# Patient Record
Sex: MALE | Race: WHITE | NOT HISPANIC OR LATINO | Employment: FULL TIME | URBAN - METROPOLITAN AREA
[De-identification: names, ages, dates, MRNs, and addresses within clinical notes are randomized per-mention and may not be internally consistent; named-entity substitution may affect disease eponyms.]

---

## 2018-06-15 ENCOUNTER — OFFICE VISIT (OUTPATIENT)
Dept: FAMILY MEDICINE CLINIC | Facility: CLINIC | Age: 38
End: 2018-06-15

## 2018-06-15 VITALS
WEIGHT: 216 LBS | DIASTOLIC BLOOD PRESSURE: 90 MMHG | BODY MASS INDEX: 30.24 KG/M2 | TEMPERATURE: 98.2 F | OXYGEN SATURATION: 99 % | SYSTOLIC BLOOD PRESSURE: 128 MMHG | HEART RATE: 75 BPM | HEIGHT: 71 IN

## 2018-06-15 DIAGNOSIS — E78.49 OTHER HYPERLIPIDEMIA: ICD-10-CM

## 2018-06-15 DIAGNOSIS — Z00.00 ENCOUNTER FOR PREVENTIVE HEALTH EXAMINATION: Primary | ICD-10-CM

## 2018-06-15 DIAGNOSIS — Z11.3 SCREENING EXAMINATION FOR STD (SEXUALLY TRANSMITTED DISEASE): ICD-10-CM

## 2018-06-15 PROCEDURE — 99395 PREV VISIT EST AGE 18-39: CPT | Performed by: INTERNAL MEDICINE

## 2018-06-15 NOTE — PROGRESS NOTES
Subjective chief complaint is a STD check but also to establish care  Juancho Urrutia is a 37 y.o. male.     History of Present Illness   Juancho is here today to establish care.  He was a previous patient of Dr. Beasley.  It is been since approximately 2015 since the patient was seen there.  He really had no significant medical problems.  Review of his laboratory work does show an elevated LDL cholesterol and triglycerides.  He also has a history of allergies.  Today he would like to be checked for STDs.  He has had unprotected sex with 2 female partners.  His not had any symptoms in terms of fever, chills, penile discharge, or rashes.  Social history he is a lifelong nonsmoker.  He rarely drinks alcohol.  Family history is remarkable for hypertension and hyperlipidemia in his parents.  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  The following portions of the patient's history were reviewed and updated as appropriate: allergies, current medications, past family history, past medical history, past social history, past surgical history and problem list.    Review of Systems   Constitutional:        He has recently changed his diet and lost approximately 20-30 pounds.   All other systems reviewed and are negative.      Objective   Physical Exam   Constitutional: He is oriented to person, place, and time. He appears well-developed and well-nourished.   HENT:   Head: Normocephalic and atraumatic.   Eyes: Conjunctivae are normal. No scleral icterus.   Neck: Carotid bruit is not present. No thyromegaly present.   Cardiovascular: Normal rate, regular rhythm, normal heart sounds and intact distal pulses.    Pulmonary/Chest: Effort normal and breath sounds normal.   Abdominal: Soft. Bowel sounds are normal. He exhibits no distension and no mass. There is no tenderness. There is no guarding.   Musculoskeletal: He exhibits no edema.   Neurological: He is alert and oriented to person, place, and time. No cranial nerve deficit.   Skin:  Skin is dry.   Psychiatric: He has a normal mood and affect.   Nursing note and vitals reviewed.        Assessment/Plan   Juancho was seen today for labs only and establish care.    Diagnoses and all orders for this visit:    Encounter for preventive health examination    Screening examination for STD (sexually transmitted disease)  -     HSV 1 & 2 - Specific Antibody, IgG; Future  -     RPR; Future  -     Hepatitis C Antibody; Future  -     Chlamydia trachomatis, Neisseria gonorrhoeae, PCR - Swab, Urine, Clean Catch; Future  -     HIV-1/O/2 Ag/Ab w Reflex; Future    Other hyperlipidemia  -     Lipid Panel; Future  -     Comprehensive Metabolic Panel; Future

## 2018-06-18 ENCOUNTER — RESULTS ENCOUNTER (OUTPATIENT)
Dept: FAMILY MEDICINE CLINIC | Facility: CLINIC | Age: 38
End: 2018-06-18

## 2018-06-18 DIAGNOSIS — Z11.3 SCREENING EXAMINATION FOR STD (SEXUALLY TRANSMITTED DISEASE): ICD-10-CM

## 2018-06-18 DIAGNOSIS — E78.49 OTHER HYPERLIPIDEMIA: ICD-10-CM

## 2018-06-19 LAB
ALBUMIN SERPL-MCNC: 4.4 G/DL (ref 3.5–5.2)
ALBUMIN/GLOB SERPL: 1.5 G/DL
ALP SERPL-CCNC: 74 U/L (ref 39–117)
ALT SERPL-CCNC: 32 U/L (ref 1–41)
AST SERPL-CCNC: 23 U/L (ref 1–40)
BILIRUB SERPL-MCNC: 0.8 MG/DL (ref 0.1–1.2)
BUN SERPL-MCNC: 14 MG/DL (ref 6–20)
BUN/CREAT SERPL: 12.1 (ref 7–25)
CALCIUM SERPL-MCNC: 9.4 MG/DL (ref 8.6–10.5)
CHLORIDE SERPL-SCNC: 99 MMOL/L (ref 98–107)
CHOLEST SERPL-MCNC: 218 MG/DL (ref 0–200)
CO2 SERPL-SCNC: 23.9 MMOL/L (ref 22–29)
CREAT SERPL-MCNC: 1.16 MG/DL (ref 0.76–1.27)
GFR SERPLBLD CREATININE-BSD FMLA CKD-EPI: 72 ML/MIN/1.73
GFR SERPLBLD CREATININE-BSD FMLA CKD-EPI: 87 ML/MIN/1.73
GLOBULIN SER CALC-MCNC: 2.9 GM/DL
GLUCOSE SERPL-MCNC: 100 MG/DL (ref 65–99)
HCV AB S/CO SERPL IA: <0.1 S/CO RATIO (ref 0–0.9)
HDLC SERPL-MCNC: 34 MG/DL (ref 40–60)
HIV 1+2 AB+HIV1 P24 AG SERPL QL IA: NON REACTIVE
HSV1 IGG SER IA-ACNC: <0.91 INDEX (ref 0–0.9)
HSV2 IGG SER IA-ACNC: <0.91 INDEX (ref 0–0.9)
LDLC SERPL CALC-MCNC: 139 MG/DL (ref 0–100)
POTASSIUM SERPL-SCNC: 4.2 MMOL/L (ref 3.5–5.2)
PROT SERPL-MCNC: 7.3 G/DL (ref 6–8.5)
RPR SER QL: NORMAL
SODIUM SERPL-SCNC: 139 MMOL/L (ref 136–145)
TRIGL SERPL-MCNC: 227 MG/DL (ref 0–150)
VLDLC SERPL CALC-MCNC: 45.4 MG/DL (ref 5–40)

## 2020-07-23 ENCOUNTER — OFFICE VISIT (OUTPATIENT)
Dept: FAMILY MEDICINE CLINIC | Facility: CLINIC | Age: 40
End: 2020-07-23

## 2020-07-23 VITALS
TEMPERATURE: 97.3 F | WEIGHT: 233.8 LBS | BODY MASS INDEX: 33.47 KG/M2 | RESPIRATION RATE: 18 BRPM | SYSTOLIC BLOOD PRESSURE: 120 MMHG | DIASTOLIC BLOOD PRESSURE: 80 MMHG | HEART RATE: 65 BPM | HEIGHT: 70 IN | OXYGEN SATURATION: 99 %

## 2020-07-23 DIAGNOSIS — R73.9 HYPERGLYCEMIA: ICD-10-CM

## 2020-07-23 DIAGNOSIS — N48.9 LESION OF PENIS: ICD-10-CM

## 2020-07-23 DIAGNOSIS — E78.49 OTHER HYPERLIPIDEMIA: Primary | ICD-10-CM

## 2020-07-23 PROCEDURE — 99213 OFFICE O/P EST LOW 20 MIN: CPT | Performed by: INTERNAL MEDICINE

## 2020-07-23 NOTE — PROGRESS NOTES
Subjective Chief complaint is follow-up on cholesterol but also spots on his penis  Juancho Urrutia is a 40 y.o. male.     History of Present Illness Juancho is here today for follow-up on his cholesterol.  He has gained some weight since his last visit.  He is working on his diet again.  Also at his last visit he had some mild hyperglycemia.  He is complaining of a spot on his penis.  This was noticed by his present partner.  It was noticed a few days ago and has since nearly resolved.  He has had no fever or chills.  He has had no blood in the urine.  He has had no penile discharge and no testicular pain.    The following portions of the patient's history were reviewed and updated as appropriate: allergies, current medications, past family history, past medical history, past social history, past surgical history and problem list.    Review of Systems   Constitutional: Negative for chills and fever.   Genitourinary: Positive for genital sores. Negative for discharge, flank pain, penile pain and testicular pain.       Objective   Physical Exam   Constitutional: He appears well-developed and well-nourished.   Cardiovascular: Normal rate, regular rhythm and normal heart sounds.   Pulmonary/Chest: Effort normal and breath sounds normal.   Abdominal: Soft. Bowel sounds are normal.   Genitourinary:   Genitourinary Comments: I currently do not see any significant lesions on his penis.  The place in question certainly has no significant erythema or blistering.   Musculoskeletal: He exhibits no edema.   Nursing note and vitals reviewed.        Assessment/Plan   Juancho was seen today for hyperlipidemia.    Diagnoses and all orders for this visit:    Other hyperlipidemia  -     Lipid Panel    Lesion of penis  -     Chlamydia trachomatis, Neisseria gonorrhoeae, PCR - , Urine, Clean Catch  -     RPR  -     HIV-1/O/2 Ag/Ab w Reflex  -     HSV 1 & 2 - Specific Antibody, IgG    Hyperglycemia  -     Comprehensive Metabolic Panel  -      Hemoglobin A1c    Juancho is here today for follow-up on his cholesterol.  We are also going to check on his sugar.  I do not see a penile lesion.  We will check for other STDs.  I suspect what ever he saw was some sort of friction injury.

## 2020-07-25 LAB
ALBUMIN SERPL-MCNC: 4.8 G/DL (ref 3.5–5.2)
ALBUMIN/GLOB SERPL: 1.8 G/DL
ALP SERPL-CCNC: 67 U/L (ref 39–117)
ALT SERPL-CCNC: 79 U/L (ref 1–41)
AST SERPL-CCNC: 35 U/L (ref 1–40)
BILIRUB SERPL-MCNC: 0.5 MG/DL (ref 0–1.2)
BUN SERPL-MCNC: 16 MG/DL (ref 6–20)
BUN/CREAT SERPL: 13.9 (ref 7–25)
C TRACH RRNA SPEC QL NAA+PROBE: NEGATIVE
CALCIUM SERPL-MCNC: 9.7 MG/DL (ref 8.6–10.5)
CHLORIDE SERPL-SCNC: 102 MMOL/L (ref 98–107)
CHOLEST SERPL-MCNC: 265 MG/DL (ref 0–200)
CO2 SERPL-SCNC: 26.5 MMOL/L (ref 22–29)
CREAT SERPL-MCNC: 1.15 MG/DL (ref 0.76–1.27)
GLOBULIN SER CALC-MCNC: 2.6 GM/DL
GLUCOSE SERPL-MCNC: 120 MG/DL (ref 65–99)
HBA1C MFR BLD: 5.9 % (ref 4.8–5.6)
HDLC SERPL-MCNC: 33 MG/DL (ref 40–60)
HIV 1+2 AB+HIV1 P24 AG SERPL QL IA: NON REACTIVE
HSV1 IGG SER IA-ACNC: <0.91 INDEX (ref 0–0.9)
HSV2 IGG SER IA-ACNC: <0.91 INDEX (ref 0–0.9)
LDLC SERPL CALC-MCNC: 178 MG/DL (ref 0–100)
N GONORRHOEA RRNA SPEC QL NAA+PROBE: NEGATIVE
POTASSIUM SERPL-SCNC: 4.6 MMOL/L (ref 3.5–5.2)
PROT SERPL-MCNC: 7.4 G/DL (ref 6–8.5)
RPR SER QL: NORMAL
SODIUM SERPL-SCNC: 138 MMOL/L (ref 136–145)
TRIGL SERPL-MCNC: 270 MG/DL (ref 0–150)
VLDLC SERPL CALC-MCNC: 54 MG/DL

## 2020-10-02 ENCOUNTER — TELEPHONE (OUTPATIENT)
Dept: FAMILY MEDICINE CLINIC | Facility: CLINIC | Age: 40
End: 2020-10-02

## 2020-10-02 NOTE — TELEPHONE ENCOUNTER
Caller: Juancho Urrutia    Relationship to patient: Self    Best call back number: [phone]    Patient is needing: Patient calling to schedule labs.

## 2021-02-19 ENCOUNTER — OFFICE VISIT (OUTPATIENT)
Dept: FAMILY MEDICINE CLINIC | Facility: CLINIC | Age: 41
End: 2021-02-19

## 2021-02-19 VITALS
HEIGHT: 70 IN | SYSTOLIC BLOOD PRESSURE: 130 MMHG | BODY MASS INDEX: 28.2 KG/M2 | OXYGEN SATURATION: 98 % | HEART RATE: 74 BPM | WEIGHT: 197 LBS | DIASTOLIC BLOOD PRESSURE: 86 MMHG | TEMPERATURE: 97.7 F

## 2021-02-19 DIAGNOSIS — E78.1 PRIMARY HYPERTRIGLYCERIDEMIA: Primary | ICD-10-CM

## 2021-02-19 DIAGNOSIS — R73.02 IMPAIRED GLUCOSE TOLERANCE: ICD-10-CM

## 2021-02-19 LAB
ALBUMIN SERPL-MCNC: 4.9 G/DL (ref 3.5–5.2)
ALBUMIN/GLOB SERPL: 1.8 G/DL
ALP SERPL-CCNC: 62 U/L (ref 39–117)
ALT SERPL-CCNC: 27 U/L (ref 1–41)
APPEARANCE UR: CLEAR
AST SERPL-CCNC: 19 U/L (ref 1–40)
BACTERIA #/AREA URNS HPF: NORMAL /HPF
BILIRUB SERPL-MCNC: 0.5 MG/DL (ref 0–1.2)
BILIRUB UR QL STRIP: NEGATIVE
BUN SERPL-MCNC: 16 MG/DL (ref 6–20)
BUN/CREAT SERPL: 15.7 (ref 7–25)
CALCIUM SERPL-MCNC: 9.8 MG/DL (ref 8.6–10.5)
CASTS URNS MICRO: NORMAL
CHLORIDE SERPL-SCNC: 102 MMOL/L (ref 98–107)
CHOLEST SERPL-MCNC: 293 MG/DL (ref 0–200)
CO2 SERPL-SCNC: 25.9 MMOL/L (ref 22–29)
COLOR UR: YELLOW
CREAT SERPL-MCNC: 1.02 MG/DL (ref 0.76–1.27)
EPI CELLS #/AREA URNS HPF: NORMAL /HPF
GLOBULIN SER CALC-MCNC: 2.7 GM/DL
GLUCOSE SERPL-MCNC: 89 MG/DL (ref 65–99)
GLUCOSE UR QL: NEGATIVE
HBA1C MFR BLD: 5.6 % (ref 4.8–5.6)
HDLC SERPL-MCNC: 46 MG/DL (ref 40–60)
HGB UR QL STRIP: NEGATIVE
KETONES UR QL STRIP: NEGATIVE
LDLC SERPL CALC-MCNC: 231 MG/DL (ref 0–100)
LEUKOCYTE ESTERASE UR QL STRIP: NEGATIVE
NITRITE UR QL STRIP: NEGATIVE
PH UR STRIP: 5.5 [PH] (ref 5–8)
POTASSIUM SERPL-SCNC: 4.6 MMOL/L (ref 3.5–5.2)
PROT SERPL-MCNC: 7.6 G/DL (ref 6–8.5)
PROT UR QL STRIP: NEGATIVE
RBC #/AREA URNS HPF: NORMAL /HPF
SODIUM SERPL-SCNC: 137 MMOL/L (ref 136–145)
SP GR UR: 1.02 (ref 1–1.03)
TRIGL SERPL-MCNC: 95 MG/DL (ref 0–150)
UROBILINOGEN UR STRIP-MCNC: NORMAL MG/DL
VLDLC SERPL CALC-MCNC: 16 MG/DL (ref 5–40)
WBC #/AREA URNS HPF: NORMAL /HPF

## 2021-02-19 PROCEDURE — 99213 OFFICE O/P EST LOW 20 MIN: CPT | Performed by: INTERNAL MEDICINE

## 2021-02-19 NOTE — PROGRESS NOTES
Answers for HPI/ROS submitted by the patient on 2/16/2021   What is the primary reason for your visit?: Other  Please describe your symptoms.: Blood work last August returned Pre-Diabetic and Fatty Liver, wanted new blood work to see if there was an improvement after weight loss and diet change.  Have you had these symptoms before?: No  How long have you been having these symptoms?: Greater than 2 weeks  Subjective Chief complaint is checkup on blood sugar  Juancho Urrutia is a 40 y.o. male.     History of Present Illness Juancho is here today for recheck on his blood sugar.  At his previous visit he had an elevated blood sugar with a elevated hemoglobin A1c.  His cholesterol numbers were also elevated and he had some elevations in his liver numbers.  Since that time he has worked on cutting out sweets and carbohydrates.  He has lost approximately 30 to 40 pounds.  He is hoping that his numbers look better.  He feels better now that he is exercising more and eating more healthy foods.    The following portions of the patient's history were reviewed and updated as appropriate: allergies, current medications, past family history, past medical history, past social history, past surgical history and problem list.    Review of Systems   Respiratory: Negative for chest tightness and shortness of breath.    Neurological: Negative for dizziness, light-headedness and headache.       Objective   Physical Exam  Vitals signs and nursing note reviewed.   Constitutional:       Appearance: Normal appearance.   Neck:      Vascular: No carotid bruit.   Cardiovascular:      Rate and Rhythm: Normal rate and regular rhythm.   Pulmonary:      Effort: Pulmonary effort is normal.      Breath sounds: No wheezing or rales.   Neurological:      Mental Status: He is alert.           Assessment/Plan   Diagnoses and all orders for this visit:    1. Primary hypertriglyceridemia (Primary)  -     Lipid Panel    2. Impaired glucose tolerance  -      Comprehensive Metabolic Panel  -     Hemoglobin A1c  -     Urinalysis With Microscopic - Urine, Clean Catch      Juancho is following up today on his impaired glucose tolerance.  This was likely a weight related issue and he seems to have lost a good amount of weight.  We will also recheck his cholesterol and liver test.

## 2021-04-16 ENCOUNTER — BULK ORDERING (OUTPATIENT)
Dept: CASE MANAGEMENT | Facility: OTHER | Age: 41
End: 2021-04-16

## 2021-04-16 DIAGNOSIS — Z23 IMMUNIZATION DUE: ICD-10-CM

## 2023-04-13 LAB
ALANINE AMINOTRANSFERASE (SGPT) (U/L) IN SER/PLAS: 34 U/L (ref 10–52)
ALBUMIN (G/DL) IN SER/PLAS: 4.3 G/DL (ref 3.4–5)
ALBUMIN (MG/L) IN URINE: <7 MG/L
ALBUMIN/CREATININE (UG/MG) IN URINE: NORMAL UG/MG CRT (ref 0–30)
ALKALINE PHOSPHATASE (U/L) IN SER/PLAS: 50 U/L (ref 33–120)
ANION GAP IN SER/PLAS: 12 MMOL/L (ref 10–20)
ASPARTATE AMINOTRANSFERASE (SGOT) (U/L) IN SER/PLAS: 35 U/L (ref 9–39)
BILIRUBIN TOTAL (MG/DL) IN SER/PLAS: 0.9 MG/DL (ref 0–1.2)
C PEPTIDE (NG/ML) IN SER/PLAS: 0.7 NG/ML (ref 0.7–3.9)
CALCIUM (MG/DL) IN SER/PLAS: 9.8 MG/DL (ref 8.6–10.6)
CARBON DIOXIDE, TOTAL (MMOL/L) IN SER/PLAS: 29 MMOL/L (ref 21–32)
CHLORIDE (MMOL/L) IN SER/PLAS: 101 MMOL/L (ref 98–107)
CHOLESTEROL (MG/DL) IN SER/PLAS: 149 MG/DL (ref 0–199)
CHOLESTEROL IN HDL (MG/DL) IN SER/PLAS: 56.3 MG/DL
CHOLESTEROL/HDL RATIO: 2.6
CREATININE (MG/DL) IN SER/PLAS: 0.94 MG/DL (ref 0.5–1.3)
CREATININE (MG/DL) IN URINE: 95.6 MG/DL (ref 20–370)
ESTIMATED AVERAGE GLUCOSE FOR HBA1C: 197 MG/DL
GFR MALE: >90 ML/MIN/1.73M2
GLUCOSE (MG/DL) IN SER/PLAS: 173 MG/DL (ref 74–99)
HEMOGLOBIN A1C/HEMOGLOBIN TOTAL IN BLOOD: 8.5 %
LDL: 78 MG/DL (ref 0–99)
POTASSIUM (MMOL/L) IN SER/PLAS: 3.9 MMOL/L (ref 3.5–5.3)
PROTEIN TOTAL: 7.2 G/DL (ref 6.4–8.2)
SODIUM (MMOL/L) IN SER/PLAS: 138 MMOL/L (ref 136–145)
THYROTROPIN (MIU/L) IN SER/PLAS BY DETECTION LIMIT <= 0.05 MIU/L: 1.85 MIU/L (ref 0.44–3.98)
TRIGLYCERIDE (MG/DL) IN SER/PLAS: 76 MG/DL (ref 0–149)
UREA NITROGEN (MG/DL) IN SER/PLAS: 20 MG/DL (ref 6–23)
VLDL: 15 MG/DL (ref 0–40)

## 2023-06-16 ENCOUNTER — TELEPHONE (OUTPATIENT)
Dept: PRIMARY CARE | Facility: CLINIC | Age: 43
End: 2023-06-16

## 2023-06-21 ENCOUNTER — OFFICE VISIT (OUTPATIENT)
Dept: PRIMARY CARE | Facility: CLINIC | Age: 43
End: 2023-06-21
Payer: COMMERCIAL

## 2023-06-21 VITALS
SYSTOLIC BLOOD PRESSURE: 104 MMHG | WEIGHT: 210 LBS | HEART RATE: 70 BPM | BODY MASS INDEX: 27.83 KG/M2 | DIASTOLIC BLOOD PRESSURE: 60 MMHG | HEIGHT: 73 IN

## 2023-06-21 DIAGNOSIS — M79.2 NEUROPATHIC PAIN: Primary | ICD-10-CM

## 2023-06-21 DIAGNOSIS — K70.30 ALCOHOLIC CIRRHOSIS OF LIVER WITHOUT ASCITES (MULTI): ICD-10-CM

## 2023-06-21 PROCEDURE — 99214 OFFICE O/P EST MOD 30 MIN: CPT | Performed by: STUDENT IN AN ORGANIZED HEALTH CARE EDUCATION/TRAINING PROGRAM

## 2023-06-21 RX ORDER — GABAPENTIN 300 MG/1
300 CAPSULE ORAL 3 TIMES DAILY
Qty: 270 CAPSULE | Refills: 1 | Status: SHIPPED | OUTPATIENT
Start: 2023-06-21 | End: 2024-04-01 | Stop reason: SDUPTHER

## 2023-06-21 RX ORDER — METFORMIN HYDROCHLORIDE 1000 MG/1
TABLET ORAL 2 TIMES DAILY
COMMUNITY

## 2023-06-21 RX ORDER — INSULIN GLARGINE-YFGN 100 [IU]/ML
INJECTION, SOLUTION SUBCUTANEOUS
COMMUNITY
Start: 2023-04-30

## 2023-06-21 RX ORDER — PANTOPRAZOLE SODIUM 40 MG/1
40 TABLET, DELAYED RELEASE ORAL DAILY
COMMUNITY
End: 2023-11-11

## 2023-06-21 RX ORDER — PEN NEEDLE, DIABETIC 31 GX5/16"
NEEDLE, DISPOSABLE MISCELLANEOUS
COMMUNITY
Start: 2022-09-24

## 2023-06-21 RX ORDER — INSULIN LISPRO 100 [IU]/ML
INJECTION, SOLUTION INTRAVENOUS; SUBCUTANEOUS
COMMUNITY

## 2023-06-21 NOTE — PROGRESS NOTES
Joseph Hawkins is a 42 y.o. male seen in Clinic at [facility] by Dr. Larry Gorman on 06/21/23 for routine care, as well as for management of the following chronic medical conditions: T2DM, GERD, prior alcohol dependence, chronic pancreatitis, cirrhosis (per imaging) with patient reported history of esophageal varices. Patient presents today for follow up of neuropathic pain. Notes he has been on Tizanadine for several years for this, discussed not good long term option. He has been out of medication for a week or so, agreeable to trialing other medication. History of alcohol abuse and poorly controlled T2DM, will start on Gabapentin 300mg at bedtime and uptitrate to TID over time with plan for follow up to assess response. Follows with endo at [facility], last labs from April 2023 with modestly improved A1C of 8.5 from 9.2 previously. Only on insulin (long acting and mealtime) and metformin at this time. Had been referred to hepatology in the past given alcohol history and findings on imaging concerning for cirrhosis. Today, upon questioning, also noted prior history of what appears to be consistent with variceal bleeding. Discussed importance of establishing care, would likely be candidate for B-blocker for recurrent prevention of bleeding. Continued alcohol cessation recommended. Close follow up in 2-3 months.     ACUTE CONCERN:   #Neuropathic Pain  - likely multifactorial in setting of alcohol abuse, poorly controlled T2DM  - stopped Tizanidine given ran out of medication   - agreeable to alternate agent/trial  - Gabapentin 300mg TID initial goal (start at bedtime, then titrate)   - follow up in 2-3 months with additional titration as needed at that time, as well as labs    CHRONIC MEDICAL CONDITIONS:   #GERD  - Pantoprazole 40mg daily   - prior alcohol abuse, sober for 3 years    #T2DM   - Lantus: 20-30 units QHS  - Humalog: on average 10 units pre-meal  - Metformin: 500mg QAM, 500mg QPM  - Has seen endocrinology   - Last  "A1C 8.5 in 04/2023  - Dr. Siegel in [place]   - Patient not currently on ACE/ARB or statin; discuss at follow up visit     #Chronic Pancreatitis   - from prior alcohol abuse/dependence   - recent hospitalization with confirmation of this on imaging   - GI referral at last visit in 11/2022, never followed up     #Prior Alcohol Abuse  - sober x3 years     #Cirrhosis   - History of alcohol abuse  - GI/hepatology referral in November, never followed up   - Evidence of recannulization paraumbilical vein consistent with portal HTN on imaging from 11/2022  - Patient notes he did require endoscopy for esophageal varices in the past   - No evidence of decompensation on exam today   [  ] hepatology referral: again stressed importance of establishing with hepatology given findings on imaging and history; would likely benefit from B-Blocker such as carvedilol given history   - Continued alcohol cessation     Past Medical History: as above   No past medical history on file.  Subspecialty Medical Care: Endo; again recommended hepatology referral today     Past Surgical History: denies   No past surgical history on file.    Medications:  Current Outpatient Medications:     BD Ultra-Fine Short Pen Needle 31 gauge x 5/16\" needle, USE ONCE DAILY AS NEEDED, Disp: , Rfl:     Semglee,insulin glarg-yfgn,Pen 100 unit/mL (3 mL) insulin pen, , Disp: , Rfl:     gabapentin (Neurontin) 300 mg capsule, Take 1 capsule (300 mg) by mouth 3 times a day., Disp: 270 capsule, Rfl: 1    HumaLOG KwikPen Insulin 100 unit/mL injection, PLEASE SEE ATTACHED FOR DETAILED DIRECTIONS, Disp: , Rfl:     metFORMIN (Glucophage) 1,000 mg tablet, Take by mouth twice a day., Disp: , Rfl:     pantoprazole (ProtoNix) 40 mg EC tablet, Take 1 tablet (40 mg) by mouth once daily., Disp: , Rfl:   Pharmacy: [facility] ([place]     Allergies: NKDA  No Known Allergies    Immunizations: Tdap 2022, PCV eligible given T2DM status, discuss at follow up visit     Family History: no " "chronic medical conditions that he is aware of   No family history on file.    Social History:   Home/Living Situation: lives at home with wife; feels safe at home; lives in [place]   Education:   Employment/Work/Vocational: not currently working; previously worked in banking   Activities: hobbies include jeff, paint miniatures, fantasy reading   Drug Use: 12mg nicotine but cutting; started smoking middle school; Delta 8 for anxiety (patient states legal in [place]), states it works better for anxiety than other medications (SSRIs, antipsychotics); prior alcohol abuse, 3 years sober   Diet: no dietary concerns   Sexuality/Contraception/Menstrual History:    Suicide/Depression/Anxiety: defers recurrent psych referral (prior adverse reactions to many SSRIs, antipsychotics)   Sleep: sleep concerns    Transition Processes:  Financial/Health Insurance: has insurance   Transportation: wife helps with transportation   Voting: registered to vote  Legal/Guardian: Wife is emergency contact, Teresa Hawkins [phone]  Contact Information: [phone]    Visit Vitals  /60   Pulse 70   Ht 1.854 m (6' 1\")   Wt 95.3 kg (210 lb)   BMI 27.71 kg/m²   BSA 2.22 m²      PHYSICAL EXAM:   General:  male in NAD, mildly disheveled appearance   HEENT: NCAT, MMM  CV: RRR  PULM: CTAB  ABD: soft, NT, ND, no rebound/guarding, no ascites noted   : no suprapubic or CVA tenderness  EXT: WWP, no edema  NEURO: A&Ox4, no gross motor or sensory deficits     Assessment/Plan    Joseph Hawkins is a 42 y.o. male seen in Clinic at [facility] by Dr. Larry Gorman on 06/21/23 for routine care, as well as for management of the following chronic medical conditions:  T2DM, GERD, prior alcohol dependence, chronic pancreatitis, cirrhosis (per imaging) with patient reported history of esophageal varices. Patient presents today for follow up of neuropathic pain. Notes he has been on Tizanadine for several years for this, discussed not good long term " option. He has been out of medication for a week or so, agreeable to trialing other medication. History of alcohol abuse and poorly controlled T2DM, will start on Gabapentin 300mg at bedtime and uptitrate to TID over time with plan for follow up to assess response. Follows with endo at [facility], last labs from April 2023 with modestly improved A1C of 8.5 from 9.2 previously. Only on insulin (long acting and mealtime) and metformin at this time. Had been referred to hepatology in the past given alcohol history and findings on imaging concerning for cirrhosis. Today, upon questioning, also noted prior history of what appears to be consistent with variceal bleeding. Discussed importance of establishing care, would likely be candidate for B-blocker for recurrent prevention of bleeding. Continued alcohol cessation recommended. Close follow up in 2-3 months.     ACUTE CONCERN:   #Neuropathic Pain  - likely multifactorial in setting of alcohol abuse, poorly controlled T2DM  - stopped Tizanidine given ran out of medication   - agreeable to alternate agent/trial  - Gabapentin 300mg TID initial goal (start at bedtime, then titrate)   - follow up in 2-3 months with additional titration as needed at that time, as well as labs    CHRONIC MEDICAL CONDITIONS:   #GERD  - Pantoprazole 40mg daily   - prior alcohol abuse, sober for 3 years    #T2DM   - Lantus: 20-30 units QHS  - Humalog: on average 10 units pre-meal  - Metformin: 500mg QAM, 500mg QPM  - Has seen endocrinology   - Last A1C 8.5 in 04/2023  - Dr. Siegel in [place]   - Patient not currently on ACE/ARB or statin; discuss at follow up visit     #Chronic Pancreatitis   - from prior alcohol abuse/dependence   - recent hospitalization with confirmation of this on imaging   - GI referral at last visit in 11/2022, never followed up     #Prior Alcohol Abuse  - sober x3 years     #Cirrhosis   - History of alcohol abuse  - GI/hepatology referral in November, never followed up   -  Evidence of recannulization paraumbilical vein consistent with portal HTN on imaging from 11/2022  - Patient notes he did require endoscopy for esophageal varices in the past   - No evidence of decompensation on exam today   [  ] hepatology referral: again stressed importance of establishing with hepatology given findings on imaging and history; would likely benefit from B-Blocker such as carvedilol given history   - Continued alcohol cessation     #Health Maintenance    Cancer Screening  - Colorectal Cancer Screening: likely requires recurrent EGD (unclear last)   - Lung Cancer Screening: tobacco use, discuss at 50   - Prostate Cancer Screening: due at 55    Laboratory Screening  - Lipid Screen: T2DM, discuss moderate intensity statin at next visit   - ASCVD Score: T2DM; last labs in 12/2022  - A1C, glucose screen: 8.5% in 04/2023 as above   - STI, HIV, Hep B screen: negative 12/2022  - Hep C screen:     Imaging Screening  - AAA screening: due at 65  - Osteoporosis/DEXA screening: screening at some point given alcohol history     Immunizations:   - Influenza: annual recommended   - COVID: updated boosters recommended  - Tdap: 2022  - Prevnar, Pneumovax: PCV-20 eligible given T2DM   - Shingrix: due at 50    Other Screening  - Health Literacy Assessment: poor  - Depression screen: known history  - Home safety/partner violence screen: negative  - Hearing/Vision screens: optho recommended   - Alcohol/tobacco/drug use screen: tobacco use, prior alcohol (sober ~3 years)   - Healthcare POA/Advanced Directives: wife    Referrals: Hepatology, Gabapentin trial   Return to clinic in 2-3 months for follow-up, sooner if acute issues arise.    Patient Discussion:    Please call back the office with any questions at [phone]. In the case of an emergency, please call 911 or go to the nearest Emergency Department.      Larry Gorman MD  Internal Medicine-Pediatrics  [facility] [address], [address]  P: [phone],  F: [phone]

## 2023-09-14 ENCOUNTER — OFFICE VISIT (OUTPATIENT)
Dept: PRIMARY CARE | Facility: CLINIC | Age: 43
End: 2023-09-14
Payer: COMMERCIAL

## 2023-09-14 VITALS
WEIGHT: 215 LBS | HEART RATE: 80 BPM | SYSTOLIC BLOOD PRESSURE: 105 MMHG | BODY MASS INDEX: 28.49 KG/M2 | OXYGEN SATURATION: 95 % | DIASTOLIC BLOOD PRESSURE: 68 MMHG | HEIGHT: 73 IN

## 2023-09-14 DIAGNOSIS — M79.2 NEUROPATHIC PAIN: ICD-10-CM

## 2023-09-14 DIAGNOSIS — K70.30 ALCOHOLIC CIRRHOSIS OF LIVER WITHOUT ASCITES (MULTI): ICD-10-CM

## 2023-09-14 DIAGNOSIS — L02.92 FURUNCLE: Primary | ICD-10-CM

## 2023-09-14 DIAGNOSIS — M62.838 MUSCLE SPASM: ICD-10-CM

## 2023-09-14 PROCEDURE — 99213 OFFICE O/P EST LOW 20 MIN: CPT | Performed by: STUDENT IN AN ORGANIZED HEALTH CARE EDUCATION/TRAINING PROGRAM

## 2023-09-14 RX ORDER — INSULIN GLARGINE 100 [IU]/ML
6 INJECTION, SOLUTION SUBCUTANEOUS
COMMUNITY
Start: 2021-11-08

## 2023-09-14 RX ORDER — CHLORHEXIDINE GLUCONATE ORAL RINSE 1.2 MG/ML
SOLUTION DENTAL
COMMUNITY
Start: 2023-08-30

## 2023-09-14 RX ORDER — TIZANIDINE 2 MG/1
2 TABLET ORAL EVERY 6 HOURS PRN
Qty: 30 TABLET | Refills: 0 | Status: SHIPPED | OUTPATIENT
Start: 2023-09-14 | End: 2023-09-24

## 2023-09-14 NOTE — PROGRESS NOTES
Joseph Hawkins is a 43 y.o. male seen in Clinic at [facility] by Dr. Larry Gorman on 09/14/23 for routine care, as well as for management of the following chronic medical conditions: T2DM, GERD, prior alcohol dependence, chronic pancreatitis, cirrhosis (per imaging) with patient reported history of esophageal varices. Patient presents today for L axillary skin lesion that appears benign. Possible resolving furuncle, already improving per patient. Recommend warm compresses, good hygiene, topical antibiotic ointment. Given small size and already improvement, do not believe systemic antibiotics warranted. If refractory/persistent or worsening, instructed to please return to care.      Regarding his chronic medical conditions, he is overdue for follow up. Follows with endocrine for diabetes. Encouraged to keep visit.     Has not yet established with hepatology regarding prior imaging findings of cirrhosis and reported history of esophageal varices. Updated referral provided today.     #Cirrhosis   - History of alcohol abuse  - GI/hepatology referral in November, never followed up; again provided today   - Evidence of recannulization paraumbilical vein consistent with portal HTN on imaging from 11/2022  - Patient notes he did require endoscopy for esophageal varices in the past   - No evidence of decompensation on exam today   [  ] hepatology referral: again stressed importance of establishing with hepatology given findings on imaging and history; would likely benefit from B-Blocker such as carvedilol given history   - Continued alcohol cessation     #T2DM (IDDM)  - Insulin, metformin management per endocrine   - encouraged to keep upcoming follow up visit   - Last A1C 8.5 in 04/2023  - Dr. Siegel in [place]   - Patient not currently on ACE/ARB or statin; discuss at next CPE/wellness visit     #Chronic Pancreatitis   - from prior alcohol abuse/dependence   - pending GI/hepatology referral     #GERD  - Pantoprazole 40mg  "daily   - prior alcohol abuse, sober for almost 4x years     #Neuropathic Pain  - likely multifactorial in setting of alcohol abuse, poorly controlled T2DM  - Gabapentin 300mg TID; previously was using Tizanidine chronically but have successfully transitioned off  - for PRN low back spasm pain, small supply of Tizanidine prescribed today     #Prior Alcohol Abuse  - sober for almost 4x years     Past Medical History: as above   No past medical history on file.  Subspecialty Medical Care: Endo; again recommended hepatology referral today     Past Surgical History: denies   No past surgical history on file.    Medications:  Current Outpatient Medications:     chlorhexidine (Peridex) 0.12 % solution, RINSE AND SPIT 2 TIMES A DAY FOR 7 DAYS, Disp: , Rfl:     insulin glargine (Lantus Solostar U-100 Insulin) 100 unit/mL (3 mL) pen, Inject 6 Units under the skin once daily., Disp: , Rfl:     BD Ultra-Fine Short Pen Needle 31 gauge x 5/16\" needle, USE ONCE DAILY AS NEEDED, Disp: , Rfl:     gabapentin (Neurontin) 300 mg capsule, Take 1 capsule (300 mg) by mouth 3 times a day., Disp: 270 capsule, Rfl: 1    HumaLOG KwikPen Insulin 100 unit/mL injection, PLEASE SEE ATTACHED FOR DETAILED DIRECTIONS, Disp: , Rfl:     metFORMIN (Glucophage) 1,000 mg tablet, Take by mouth twice a day., Disp: , Rfl:     pantoprazole (ProtoNix) 40 mg EC tablet, Take 1 tablet (40 mg) by mouth once daily., Disp: , Rfl:     Semglee,insulin glarg-yfgn,Pen 100 unit/mL (3 mL) insulin pen, , Disp: , Rfl:     tiZANidine (Zanaflex) 2 mg tablet, Take 1 tablet (2 mg) by mouth every 6 hours if needed for muscle spasms for up to 10 days., Disp: 30 tablet, Rfl: 0  Ph[facility]     Allergies: NKDA  No Known Allergies    Immunizations:   Tdap 2022-->due 2032  PCV eligible given T2DM status, discuss at follow up visit   Flu, updated COVID vaccines recommended     Family History: no chronic medical conditions that he is aware of   No family history on " "file.    Social History:   Home/Living Situation: lives at home with wife; feels safe at home; lives in [place]   Education:   Employment/Work/Vocational: not currently working; previously worked in banking   Activities: hobbies include jeff, paint miniatures, fantasy reading   Drug Use: 12mg nicotine but cutting; started smoking middle school; Delta 8 for anxiety (patient states legal in [place]), states it works better for anxiety than other medications (SSRIs, antipsychotics); prior alcohol abuse, almost 4x years sober   Diet: no dietary concerns   Sexuality/Contraception/Menstrual History:    Suicide/Depression/Anxiety: defers recurrent psych referral (prior adverse reactions to many SSRIs, antipsychotics)   Sleep: sleep concerns    Transition Processes:  Financial/Health Insurance: has insurance   Transportation: wife helps with transportation   Voting: registered to vote  Legal/Guardian: Wife is emergency contact, Teresa Hawkins [phone]  Contact Information: [phone]    Visit Vitals  /68   Pulse 80   Ht 1.854 m (6' 1\")   Wt 97.5 kg (215 lb)   SpO2 95%   BMI 28.37 kg/m²   BSA 2.24 m²      PHYSICAL EXAM:   General:  male in NAD, mildly disheveled appearance   HEENT: NCAT, MMM  CV: RRR  PULM: CTAB  ABD: soft, NT, ND, no rebound/guarding, no ascites noted   : no suprapubic or CVA tenderness  EXT: WWP, no edema  SKIN: R axillary slightly raised and erythematous skin lesion with small amount of induration   NEURO: A&Ox4, no gross motor or sensory deficits     Assessment/Plan    Joseph Hawkins is a 43 y.o. male [place] [facility] [place] Larry Gorman on 09/14/23 for routine care, as well as for management of the following chronic medical conditions: T2DM, GERD, prior alcohol dependence, chronic pancreatitis, cirrhosis (per imaging) with patient reported history of esophageal varices. Patient presents today for L axillary skin lesion that appears benign. Possible resolving furuncle, " already improving per patient. Recommend warm compresses, good hygiene, topical antibiotic ointment. Given small size and already improvement, do not believe systemic antibiotics warranted. If refractory/persistent or worsening, instructed to please return to care.      Regarding his chronic medical conditions, he is overdue for follow up. Follows with endocrine for diabetes. Encouraged to keep visit.     Has not yet established with hepatology regarding prior imaging findings of cirrhosis and reported history of esophageal varices. Updated referral provided today.     #Cirrhosis   - History of alcohol abuse  - GI/hepatology referral in November, never followed up; again provided today   - Evidence of recannulization paraumbilical vein consistent with portal HTN on imaging from 11/2022  - Patient notes he did require endoscopy for esophageal varices in the past   - No evidence of decompensation on exam today   [  ] hepatology referral: again stressed importance of establishing with hepatology given findings on imaging and history; would likely benefit from B-Blocker such as carvedilol given history   - Continued alcohol cessation     #T2DM (IDDM)  - Insulin, metformin management per endocrine   - encouraged to keep upcoming follow up visit   - Last A1C 8.5 in 04/2023  - Dr. Siegel in [place]   - Patient not currently on ACE/ARB or statin; discuss at next CPE/wellness visit     #Chronic Pancreatitis   - from prior alcohol abuse/dependence   - pending GI/hepatology referral     #GERD  - Pantoprazole 40mg daily   - prior alcohol abuse, sober for almost 4x years     #Neuropathic Pain  - likely multifactorial in setting of alcohol abuse, poorly controlled T2DM  - Gabapentin 300mg TID; previously was using Tizanidine chronically but have successfully transitioned off  - for PRN low back spasm pain, small supply of Tizanidine prescribed today     #Prior Alcohol Abuse  - sober for almost 4x years     #Health  Maintenance    Cancer Screening  - Colorectal Cancer Screening: likely requires recurrent EGD (unclear last), hepatology referral   - Lung Cancer Screening: tobacco use, discuss at 50   - Prostate Cancer Screening: due at 55    Laboratory Screening  - Lipid Screen: T2DM, discuss moderate intensity statin at next CPE   - ASCVD Score: T2DM  - A1C, glucose screen: 8.5% in 04/2023 as above   - STI, HIV, Hep B screen: negative 12/2022  - Hep C screen: negative 2022    Imaging Screening  - AAA screening: due at 65  - Osteoporosis/DEXA screening: screening at some point given alcohol history     Immunizations:   - Influenza: annual recommended   - COVID: updated boosters recommended  - Tdap: 2022-->due 2032  - Prevnar, Pneumovax: PCV-20 eligible given T2DM   - Shingrix: due at 50    Other Screening  - Health Literacy Assessment: poor  - Depression screen: known history  - Home safety/partner violence screen: negative  - Hearing/Vision screens: optho recommended previously   - Alcohol/tobacco/drug use screen: tobacco use, prior alcohol (sober ~4 years)   - Healthcare POA/Advanced Directives: wife    Referrals: Hepatology, endocrine follow up   Return to clinic in 2 months for CPE, sooner if acute issues arise.    Patient Discussion:    Please call back the office with any questions at [phone]. In the case of an emergency, please call [phone] or go to the nearest Emergency Department.      Larry Gorman MD  Internal Medicine-Pediatrics  [facility] [address], [address]  P: [phone], F: [phone]

## 2023-09-14 NOTE — PATIENT INSTRUCTIONS
Warm compresses and antibiotic ointment to area  If not improving, let me know, we will likely try oral antibiotics at that time    Hepatology referral    As need Tizanidine for muscle spasms but not for long term or daily use    Follow up with endocrine recommended    Best,   Dr. Gorman

## 2023-10-31 ENCOUNTER — LAB (OUTPATIENT)
Dept: LAB | Facility: LAB | Age: 43
End: 2023-10-31
Payer: COMMERCIAL

## 2023-10-31 DIAGNOSIS — R53.83 OTHER FATIGUE: ICD-10-CM

## 2023-10-31 DIAGNOSIS — E78.2 MIXED HYPERLIPIDEMIA: ICD-10-CM

## 2023-10-31 DIAGNOSIS — E10.69 TYPE 1 DIABETES MELLITUS WITH OTHER SPECIFIED COMPLICATION (MULTI): Primary | ICD-10-CM

## 2023-10-31 LAB
ALBUMIN SERPL BCP-MCNC: 4.3 G/DL (ref 3.4–5)
ALP SERPL-CCNC: 73 U/L (ref 33–120)
ALT SERPL W P-5'-P-CCNC: 49 U/L (ref 10–52)
ANION GAP SERPL CALC-SCNC: 12 MMOL/L (ref 10–20)
AST SERPL W P-5'-P-CCNC: 46 U/L (ref 9–39)
BILIRUB SERPL-MCNC: 1.4 MG/DL (ref 0–1.2)
BUN SERPL-MCNC: 14 MG/DL (ref 6–23)
C PEPTIDE SERPL-MCNC: 0.6 NG/ML (ref 0.7–3.9)
CALCIUM SERPL-MCNC: 9.8 MG/DL (ref 8.6–10.6)
CHLORIDE SERPL-SCNC: 100 MMOL/L (ref 98–107)
CHOLEST SERPL-MCNC: 172 MG/DL (ref 0–199)
CHOLESTEROL/HDL RATIO: 3.3
CO2 SERPL-SCNC: 31 MMOL/L (ref 21–32)
CREAT SERPL-MCNC: 0.89 MG/DL (ref 0.5–1.3)
CREAT UR-MCNC: 93.8 MG/DL (ref 20–370)
EST. AVERAGE GLUCOSE BLD GHB EST-MCNC: 232 MG/DL
GFR SERPL CREATININE-BSD FRML MDRD: >90 ML/MIN/1.73M*2
GLUCOSE SERPL-MCNC: 261 MG/DL (ref 74–99)
HBA1C MFR BLD: 9.7 %
HDLC SERPL-MCNC: 51.4 MG/DL
LDLC SERPL CALC-MCNC: 83 MG/DL
MICROALBUMIN UR-MCNC: <7 MG/L
MICROALBUMIN/CREAT UR: NORMAL MG/G{CREAT}
NON HDL CHOLESTEROL: 121 MG/DL (ref 0–149)
POTASSIUM SERPL-SCNC: 4.3 MMOL/L (ref 3.5–5.3)
PROT SERPL-MCNC: 7.4 G/DL (ref 6.4–8.2)
SODIUM SERPL-SCNC: 139 MMOL/L (ref 136–145)
TRIGL SERPL-MCNC: 189 MG/DL (ref 0–149)
TSH SERPL-ACNC: 1.76 MIU/L (ref 0.44–3.98)
VLDL: 38 MG/DL (ref 0–40)

## 2023-10-31 PROCEDURE — 80061 LIPID PANEL: CPT

## 2023-10-31 PROCEDURE — 82570 ASSAY OF URINE CREATININE: CPT

## 2023-10-31 PROCEDURE — 36415 COLL VENOUS BLD VENIPUNCTURE: CPT

## 2023-10-31 PROCEDURE — 83036 HEMOGLOBIN GLYCOSYLATED A1C: CPT

## 2023-10-31 PROCEDURE — 82043 UR ALBUMIN QUANTITATIVE: CPT

## 2023-10-31 PROCEDURE — 84681 ASSAY OF C-PEPTIDE: CPT

## 2023-10-31 PROCEDURE — 84443 ASSAY THYROID STIM HORMONE: CPT

## 2023-10-31 PROCEDURE — 80053 COMPREHEN METABOLIC PANEL: CPT

## 2023-11-10 DIAGNOSIS — K21.9 GASTROESOPHAGEAL REFLUX DISEASE, UNSPECIFIED WHETHER ESOPHAGITIS PRESENT: Primary | ICD-10-CM

## 2023-11-11 RX ORDER — PANTOPRAZOLE SODIUM 40 MG/1
40 TABLET, DELAYED RELEASE ORAL DAILY
Qty: 90 TABLET | Refills: 1 | Status: SHIPPED | OUTPATIENT
Start: 2023-11-11 | End: 2024-03-15

## 2023-11-17 ENCOUNTER — OFFICE VISIT (OUTPATIENT)
Dept: GASTROENTEROLOGY | Facility: CLINIC | Age: 43
End: 2023-11-17
Payer: COMMERCIAL

## 2023-11-17 VITALS
HEIGHT: 73 IN | WEIGHT: 220 LBS | HEART RATE: 60 BPM | BODY MASS INDEX: 29.16 KG/M2 | DIASTOLIC BLOOD PRESSURE: 74 MMHG | SYSTOLIC BLOOD PRESSURE: 112 MMHG

## 2023-11-17 DIAGNOSIS — K86.1 CHRONIC PANCREATITIS, UNSPECIFIED PANCREATITIS TYPE (MULTI): ICD-10-CM

## 2023-11-17 DIAGNOSIS — K70.30 ALCOHOLIC CIRRHOSIS OF LIVER WITHOUT ASCITES (MULTI): ICD-10-CM

## 2023-11-17 DIAGNOSIS — R12 HEARTBURN: ICD-10-CM

## 2023-11-17 DIAGNOSIS — R74.8 ELEVATED LIVER ENZYMES: Primary | ICD-10-CM

## 2023-11-17 PROCEDURE — 99205 OFFICE O/P NEW HI 60 MIN: CPT | Performed by: STUDENT IN AN ORGANIZED HEALTH CARE EDUCATION/TRAINING PROGRAM

## 2023-11-17 NOTE — PATIENT INSTRUCTIONS
1-for your liver we need to check some blood tests and ultrasound, please follow a low-salt less than 2000 mg/day high-protein diet, you can have an evening snack, for pain you can take Tylenol less than 2000 mg/day, avoid NSAIDs over-the-counter medications herbals  2-good job quitting alcohol, will refer you to psychology, good job following with a program  3-continue with the pantoprazole daily for the heartburn

## 2023-11-17 NOTE — PROGRESS NOTES
43-year-old gentleman recently moved from [place] with history of history of alcohol abuse since he was 10 years of age, mentioned that he stopped 4 years ago but drinks once every few months, last drink 3 months ago, chronic pancreatitis, diabetes likely secondary to chronic pancreatitis, GERD, reported alcoholic liver disease presenting to establish care.  He denies any fever chills nausea vomiting dysphagia odynophagia melena hematochezia hematemesis.    EGD in [place] showing Lani-Herrera tear according to patient, no esophageal varices according to patient  Colonoscopy more than 2 years ago for abdominal discomfort as per him unknown result  Family said reviewed, not pertinent to chief complaint  1-3 bowel movements per day  Denies NSAIDs, alcohol as above, positive marijuana, denies drug use, positive vaping      The note was created using voice recognition transcription software. Despite proofreading, unintentional typographical errors may be present. Please contact the GI office with any questions or concerns.     Current Medications: reviewed    A 10 point review of system is negative except for what is mentioned in the HPI    Follow up with GI was advised       Vital Signs: Reviewed    Physical Exam:  General: no apparent distress, pleasant and cooperative  Skin: Spider angiomas, warm and dry, no jaundice  HEENT: No scleral icterus, no conjunctival pallor, normocephalic, atraumatic, mucous membranes moist  Neck:  atraumatic, trachea midline, no JVD  Chest:  decreased air entry to auscultation bilaterally. No wheezes, rales, or rhonchi  CV:  Regular rate and rhythm.  Positive S1/S2  Abdomen: no distension, +BS, soft, non-tender to palpation, no rebound tenderness, no guarding, no rigidity, no discernible ascites   Extremities: no lower extremity edema, Chronic pigmentary changes, no cyanosis  Neurological:  A&Ox3 , no asterixis  Psychiatric: cooperative     Investigations:  Labs, radiological  imaging and cardiac work up were reviewed    1-hepatitis C antibody - 1/2022    2-BMI 28, healthy lifestyle advised    3-alcohol use as above, multivitamin thiamine folic acid, alcohol cessation advised, following with an alcohol cessation program    4-reported liver disease in the setting of history of alcohol abuse  *No reported ascites esophageal varices or hepatic encephalopathy    CLD work-up ASMA 1/20, immune to hepatitis A, FE % 28%, TIBC 384, ferritin 105 [otherwise negative autoimmune viral metabolic], needs hepatitis B vaccine, ELF score 8.25 L, 7/2024 FibroScan F2 disease, ultrasound abdomen, will consider lactulose and rifaximin, will consider Coreg, will schedule screening EGD after results, low-salt high-protein diet, can have Tylenol less than 2000 mg/day, avoid NSAIDs over-the-counter medications herbals unpasteurized milk undercooked seafood    5-chronic pancreatitis in the setting of history of alcohol abuse, low-fat diet advised, denies any symptoms, ultrasound 11/2023 showing cystic lesion in the head of the pancreas, see official report, discussed with radiology Dr. Reinoso, order MRI pancreas, will follow    6-chronic heartburn, controlled on Protonix daily, lifestyle changes advised    7-marijuana use, vaping, cessation advised

## 2023-12-11 ENCOUNTER — APPOINTMENT (OUTPATIENT)
Dept: BEHAVIORAL HEALTH | Facility: CLINIC | Age: 43
End: 2023-12-11

## 2023-12-12 ENCOUNTER — OFFICE VISIT (OUTPATIENT)
Dept: BEHAVIORAL HEALTH | Facility: CLINIC | Age: 43
End: 2023-12-12
Payer: COMMERCIAL

## 2023-12-12 VITALS — DIASTOLIC BLOOD PRESSURE: 60 MMHG | SYSTOLIC BLOOD PRESSURE: 130 MMHG | HEART RATE: 63 BPM

## 2023-12-12 DIAGNOSIS — F10.20 UNCOMPLICATED ALCOHOL DEPENDENCE (MULTI): ICD-10-CM

## 2023-12-12 PROCEDURE — 90791 PSYCH DIAGNOSTIC EVALUATION: CPT | Performed by: COUNSELOR

## 2023-12-12 ASSESSMENT — PAIN SCALES - GENERAL: PAINLEVEL: 2

## 2023-12-14 NOTE — PROGRESS NOTES
"    ARS ASSESSMENT      NAME: Joseph Hawkins  MRN: [id number]  DATE: 12/14/23      Reason for Visit: the patient is referred to us by his PCP. He has a history of heavy drinking. His health has been quite compromised by his alcohol abuse. He has a history of alcohol induced pancreatitis and elevated liver enzymes. He reports today that he last drank about 14 days ago when he binged on a bottle of liquor.     Diagnoses/Problems:  There is no problem list on file for this patient.     Provider Impression: The patient has a very long history of alcohol use disorder. He reports that he started drinking at age 10. Was an unruly child and was \"kicked out\" of [employer]. For most of his life he has been a daily drinker. He states that he has had \"23 or 24\" episode of rehab treatment for his alcoholism. He states that at age 22, he had 9 months of abstinence from alcohol. However, he typically drinks shortly after he leaves a rehab program. He believes that he is now in control of his drinking because he is usually not drinking, but he now binges several times oer year instead of drinking on a daily basis. He comes to us today as he is referred by his PCP. He has diabetes, chronic pancreatitis and elevated liver enzymes, He also uses cannabis on a daily basis He is not interested in addiction treatment, so I am unclear as to why he followed through with the referral. He is unwilling to stop using cannabis, and he does not believe he needs traditional treatment for his alcoholism. Apparently he has a history of three psychiatric admissions for depression. However, he insists that he has never had any intention to harm himself or to commit suicide. He believes that he cannot stop the cannabis use because he believes it :helps with my anxiety\"/ In the end, I referred him to an intensive DBT program in [place], [place]. He states he is very interested in following through with that referreal.     Level of Care Assessment:  D1: " "Acute Intx/Withdrawal Potential: 2  D2: Biomedical Conditions/Complications: 2  D3: Emtional Behavioral/Cog. Conditions Complications: 1  D4: Treatment Acceptance/Resistance: 2  D5: Relapse/Cont. Use/Cont. Problem Potential: 2  D6: Recovery Environment: 1    Level of Care Recommended:  Client declined our offer of IOP. Therefore, referred to an IOP program with DBT approach   Level of Care Placed: Pending    Comments:     Readiness For Treatment:  pre-contemplation    Substance Use History:  Alcohol The patient has been a heavy daily drinker for most of his life. He has over 20 episodes of rehab in his history. In the past year, he has not been drinking daily, but has been binging on liquor a few times per year.   Amphetamines He sttes that he used IV methamphetamine when he was in his early 20's. He denies any recent use.    Cocaine Not used for past 20 years.   Marijuana Lifelong cannabis user. Currently using delta 8 cannabis on a daily basis.      Impact on Daily Life: home disruption, work disruption, school disruption, and law involvement    History of Treatment:  No    Other Behaviors:  none    Past Psychiatric History:  Past psychiatric history The patient states that he was an unruly child who was prescribed Adderall and Valium as a child. He states that in his life, he has been hospitalized \"for depression\" three times. However, he adamantly denies any history of suicidal ideation, plan or intention. He states that he did endure childhood trauma, but chose not to disclose any details about this. He states that his last hospitalization was about four years ago when he was \"paranoid\". At present, he I not taking any psychiatric medications and he has no therapist.   Suicidal Ideation:  No  Suicidal Attempts:  No  Suicide Protective Factors:  family/social support and no prior history of attempts  Neuropsychological Factors:  None noted    Psych Social History ARS:  The patient was born and raised in " "[place]. He and his second wife moved to [place] two years ago. The patient has not worked in the past two years. His current wife and he have been  10 years. She works as an . The patient states that his drinking and his inability to be organized have prevented him from working for the past 10 years.   Abuse/Neglect History:  Abuse history  Relationship History:  currently in a relationship  Sexual History:  Sexual orientation Heterosexual.   Education:  last grade completed 8    Employment History:  never substantially employed  Unemployed    History:  no history of  affiliation  Legal History:  Life time arrests arrested for stealing alcohol at age 15. Public intoxication age 18. DUI age 22. DUI age 32.   Financial Stressors:    Cultural/Hoahaoism/Spiritual Orientation:  raised Quaker  Leisure/Recreation/Hobbies:    Collateral Information:    Past Medical History:  History    Surgical History:  No past surgical history on file.  Family History:  No family history on file.  Allergies:  No Known Allergies  Current Meds:    Current Outpatient Medications:     BD Ultra-Fine Short Pen Needle 31 gauge x 5/16\" needle, USE ONCE DAILY AS NEEDED, Disp: , Rfl:     chlorhexidine (Peridex) 0.12 % solution, RINSE AND SPIT 2 TIMES A DAY FOR 7 DAYS, Disp: , Rfl:     gabapentin (Neurontin) 300 mg capsule, Take 1 capsule (300 mg) by mouth 3 times a day., Disp: 270 capsule, Rfl: 1    HumaLOG KwikPen Insulin 100 unit/mL injection, PLEASE SEE ATTACHED FOR DETAILED DIRECTIONS, Disp: , Rfl:     insulin glargine (Lantus Solostar U-100 Insulin) 100 unit/mL (3 mL) pen, Inject 6 Units under the skin once daily., Disp: , Rfl:     metFORMIN (Glucophage) 1,000 mg tablet, Take by mouth twice a day., Disp: , Rfl:     pantoprazole (ProtoNix) 40 mg EC tablet, TAKE 1 TABLET BY MOUTH EVERY DAY, Disp: 90 tablet, Rfl: 1    Semglee,insulin glarg-yfgn,Pen 100 unit/mL (3 mL) insulin pen, , Disp: , Rfl:     " tiZANidine (Zanaflex) 2 mg tablet, Take 1 tablet (2 mg) by mouth every 6 hours if needed for muscle spasms for up to 10 days., Disp: 30 tablet, Rfl: 0   Vitals:  There is no height or weight on file to calculate BSA.    Mental Status Exam:  General Appearance: disheveled  Attitude/Behavior: cooperative  Motor: no psychomotor agitation or retardation, no tremor or other abnormal movements  Speech: normal rate, volume, prosody  Gait/Station: WFL  Mood: euthymic  Affect: euthymic, full-range  Thought Process: linear, goal directed  Thought Associations: no loosening of associations  Thought Content: normal  Perception: no perceptual abnormalities noted  Sensorium: alert and oriented to person, place, time and situation  Insight: limited  Judgment: limited  Cognition: cognitively intact to conversational testing with respect to attention, orientation, fund of knowledge, recent and remote memory, and language  Testing: N/A      Pain Scale:  2  Pain Quality: aching   Does your pain make it hard to do any of these things that you normally do?  work  Vitals:  There is no height or weight on file to calculate BSA.    Tobacco Screening:   Social History     Tobacco Use   Smoking Status Never   Smokeless Tobacco Current       Domestic Violence:      Elder Abuse:  No   Depression/Suicide Screening:      CSSR-S Score: Negative     PHQ-9 Score: 23    SUKHWINDER-7 Score: N/a    Nutrition Screening:    Social Determinates of Health:  Social Determinants of Health     Tobacco Use: High Risk (11/17/2023)    Patient History     Smoking Tobacco Use: Never     Smokeless Tobacco Use: Current     Passive Exposure: Not on file   Alcohol Use: Not on file   Financial Resource Strain: Not on file   Food Insecurity: Not on file   Transportation Needs: Not on file   Physical Activity: Not on file   Stress: Not on file   Social Connections: Not on file   Intimate Partner Violence: Not on file   Depression: Not on file   Housing Stability: Not on file    Utilities: Not on file   Digital Equity: Not on file       Results Data:

## 2023-12-22 ENCOUNTER — APPOINTMENT (OUTPATIENT)
Dept: ENDOCRINOLOGY | Facility: CLINIC | Age: 43
End: 2023-12-22

## 2024-03-15 DIAGNOSIS — K21.9 GASTROESOPHAGEAL REFLUX DISEASE, UNSPECIFIED WHETHER ESOPHAGITIS PRESENT: ICD-10-CM

## 2024-03-15 RX ORDER — PANTOPRAZOLE SODIUM 40 MG/1
40 TABLET, DELAYED RELEASE ORAL
Qty: 90 TABLET | Refills: 1 | Status: SHIPPED | OUTPATIENT
Start: 2024-03-15 | End: 2024-05-08

## 2024-04-01 DIAGNOSIS — M79.2 NEUROPATHIC PAIN: ICD-10-CM

## 2024-04-02 RX ORDER — GABAPENTIN 300 MG/1
300 CAPSULE ORAL 3 TIMES DAILY
Qty: 270 CAPSULE | Refills: 1 | Status: SHIPPED | OUTPATIENT
Start: 2024-04-02 | End: 2024-09-29

## 2024-04-12 ENCOUNTER — APPOINTMENT (OUTPATIENT)
Dept: ENDOCRINOLOGY | Facility: CLINIC | Age: 44
End: 2024-04-12
Payer: COMMERCIAL

## 2024-05-08 DIAGNOSIS — K21.9 GASTROESOPHAGEAL REFLUX DISEASE, UNSPECIFIED WHETHER ESOPHAGITIS PRESENT: ICD-10-CM

## 2024-05-08 RX ORDER — PANTOPRAZOLE SODIUM 40 MG/1
40 TABLET, DELAYED RELEASE ORAL DAILY
Qty: 90 TABLET | Refills: 1 | Status: SHIPPED | OUTPATIENT
Start: 2024-05-08

## 2024-06-14 ENCOUNTER — APPOINTMENT (OUTPATIENT)
Dept: PRIMARY CARE | Facility: CLINIC | Age: 44
End: 2024-06-14
Payer: COMMERCIAL

## 2024-06-14 ENCOUNTER — TELEPHONE (OUTPATIENT)
Dept: PRIMARY CARE | Facility: CLINIC | Age: 44
End: 2024-06-14

## 2024-06-14 ENCOUNTER — LAB (OUTPATIENT)
Dept: LAB | Facility: LAB | Age: 44
End: 2024-06-14
Payer: COMMERCIAL

## 2024-06-14 VITALS
DIASTOLIC BLOOD PRESSURE: 75 MMHG | HEART RATE: 81 BPM | SYSTOLIC BLOOD PRESSURE: 114 MMHG | WEIGHT: 201.8 LBS | OXYGEN SATURATION: 94 % | BODY MASS INDEX: 26.62 KG/M2

## 2024-06-14 DIAGNOSIS — E11.69 TYPE 2 DIABETES MELLITUS WITH OTHER SPECIFIED COMPLICATION, WITH LONG-TERM CURRENT USE OF INSULIN (MULTI): ICD-10-CM

## 2024-06-14 DIAGNOSIS — M79.2 NEUROPATHIC PAIN: ICD-10-CM

## 2024-06-14 DIAGNOSIS — F10.10 ALCOHOL ABUSE: Primary | ICD-10-CM

## 2024-06-14 DIAGNOSIS — Z79.4 TYPE 2 DIABETES MELLITUS WITH OTHER SPECIFIED COMPLICATION, WITH LONG-TERM CURRENT USE OF INSULIN (MULTI): ICD-10-CM

## 2024-06-14 DIAGNOSIS — M62.838 MUSCLE SPASM: ICD-10-CM

## 2024-06-14 DIAGNOSIS — R11.0 NAUSEA: ICD-10-CM

## 2024-06-14 DIAGNOSIS — R74.8 ELEVATED LIVER ENZYMES: ICD-10-CM

## 2024-06-14 DIAGNOSIS — F10.10 ALCOHOL ABUSE: ICD-10-CM

## 2024-06-14 LAB
A1AT SERPL NEPH-MCNC: 153 MG/DL (ref 84–218)
ALBUMIN SERPL BCP-MCNC: 5.2 G/DL (ref 3.4–5)
ALP SERPL-CCNC: 54 U/L (ref 33–120)
ALT SERPL W P-5'-P-CCNC: 24 U/L (ref 10–52)
ANION GAP SERPL CALC-SCNC: 14 MMOL/L (ref 10–20)
AST SERPL W P-5'-P-CCNC: 21 U/L (ref 9–39)
BILIRUB DIRECT SERPL-MCNC: 0.3 MG/DL (ref 0–0.3)
BILIRUB SERPL-MCNC: 1 MG/DL (ref 0–1.2)
BUN SERPL-MCNC: 14 MG/DL (ref 6–23)
CALCIUM SERPL-MCNC: 10.6 MG/DL (ref 8.6–10.6)
CERULOPLASMIN SERPL-MCNC: 26.7 MG/DL (ref 20–60)
CHLORIDE SERPL-SCNC: 100 MMOL/L (ref 98–107)
CHOLEST SERPL-MCNC: 104 MG/DL (ref 0–199)
CHOLESTEROL/HDL RATIO: 2.1
CO2 SERPL-SCNC: 28 MMOL/L (ref 21–32)
CREAT SERPL-MCNC: 0.98 MG/DL (ref 0.5–1.3)
CREAT UR-MCNC: 202.8 MG/DL (ref 20–370)
EGFRCR SERPLBLD CKD-EPI 2021: >90 ML/MIN/1.73M*2
ERYTHROCYTE [DISTWIDTH] IN BLOOD BY AUTOMATED COUNT: 12.1 % (ref 11.5–14.5)
EST. AVERAGE GLUCOSE BLD GHB EST-MCNC: 217 MG/DL
FERRITIN SERPL-MCNC: 105 NG/ML (ref 20–300)
FOLATE SERPL-MCNC: >24 NG/ML
GLUCOSE SERPL-MCNC: 177 MG/DL (ref 74–99)
HAV AB SER QL IA: REACTIVE
HBA1C MFR BLD: 9.2 %
HBV CORE IGM SER QL: NONREACTIVE
HBV SURFACE AB SER-ACNC: <3.1 MIU/ML
HBV SURFACE AG SERPL QL IA: NONREACTIVE
HCT VFR BLD AUTO: 43.8 % (ref 41–52)
HDLC SERPL-MCNC: 49.1 MG/DL
HGB BLD-MCNC: 15.6 G/DL (ref 13.5–17.5)
INR PPP: 1 (ref 0.9–1.1)
IRON SATN MFR SERPL: 28 % (ref 25–45)
IRON SERPL-MCNC: 107 UG/DL (ref 35–150)
LDLC SERPL CALC-MCNC: 37 MG/DL
LIPASE SERPL-CCNC: 28 U/L (ref 9–82)
MAGNESIUM SERPL-MCNC: 1.86 MG/DL (ref 1.6–2.4)
MCH RBC QN AUTO: 31.8 PG (ref 26–34)
MCHC RBC AUTO-ENTMCNC: 35.6 G/DL (ref 32–36)
MCV RBC AUTO: 89 FL (ref 80–100)
MICROALBUMIN UR-MCNC: 11.9 MG/L
MICROALBUMIN/CREAT UR: 5.9 UG/MG CREAT
NON HDL CHOLESTEROL: 55 MG/DL (ref 0–149)
NRBC BLD-RTO: 0 /100 WBCS (ref 0–0)
PHOSPHATE SERPL-MCNC: 2.8 MG/DL (ref 2.5–4.9)
PLATELET # BLD AUTO: 154 X10*3/UL (ref 150–450)
POTASSIUM SERPL-SCNC: 4.4 MMOL/L (ref 3.5–5.3)
PROT SERPL-MCNC: 7.7 G/DL (ref 6.4–8.2)
PROTHROMBIN TIME: 11 SECONDS (ref 9.8–12.8)
RBC # BLD AUTO: 4.91 X10*6/UL (ref 4.5–5.9)
SODIUM SERPL-SCNC: 138 MMOL/L (ref 136–145)
TIBC SERPL-MCNC: 384 UG/DL (ref 240–445)
TRIGL SERPL-MCNC: 90 MG/DL (ref 0–149)
UIBC SERPL-MCNC: 277 UG/DL (ref 110–370)
VIT B12 SERPL-MCNC: 1065 PG/ML (ref 211–911)
VLDL: 18 MG/DL (ref 0–40)
WBC # BLD AUTO: 5.4 X10*3/UL (ref 4.4–11.3)

## 2024-06-14 PROCEDURE — 86708 HEPATITIS A ANTIBODY: CPT

## 2024-06-14 PROCEDURE — 86706 HEP B SURFACE ANTIBODY: CPT

## 2024-06-14 PROCEDURE — 86705 HEP B CORE ANTIBODY IGM: CPT

## 2024-06-14 PROCEDURE — G2211 COMPLEX E/M VISIT ADD ON: HCPCS | Performed by: STUDENT IN AN ORGANIZED HEALTH CARE EDUCATION/TRAINING PROGRAM

## 2024-06-14 PROCEDURE — 83036 HEMOGLOBIN GLYCOSYLATED A1C: CPT

## 2024-06-14 PROCEDURE — 83540 ASSAY OF IRON: CPT

## 2024-06-14 PROCEDURE — 86015 ACTIN ANTIBODY EACH: CPT

## 2024-06-14 PROCEDURE — 82390 ASSAY OF CERULOPLASMIN: CPT

## 2024-06-14 PROCEDURE — 82746 ASSAY OF FOLIC ACID SERUM: CPT

## 2024-06-14 PROCEDURE — 99214 OFFICE O/P EST MOD 30 MIN: CPT | Performed by: STUDENT IN AN ORGANIZED HEALTH CARE EDUCATION/TRAINING PROGRAM

## 2024-06-14 PROCEDURE — 82607 VITAMIN B-12: CPT

## 2024-06-14 PROCEDURE — 86381 MITOCHONDRIAL ANTIBODY EACH: CPT

## 2024-06-14 PROCEDURE — 86038 ANTINUCLEAR ANTIBODIES: CPT

## 2024-06-14 PROCEDURE — 83735 ASSAY OF MAGNESIUM: CPT

## 2024-06-14 PROCEDURE — 87340 HEPATITIS B SURFACE AG IA: CPT

## 2024-06-14 PROCEDURE — 84425 ASSAY OF VITAMIN B-1: CPT

## 2024-06-14 PROCEDURE — 3074F SYST BP LT 130 MM HG: CPT | Performed by: STUDENT IN AN ORGANIZED HEALTH CARE EDUCATION/TRAINING PROGRAM

## 2024-06-14 PROCEDURE — 82570 ASSAY OF URINE CREATININE: CPT

## 2024-06-14 PROCEDURE — 84100 ASSAY OF PHOSPHORUS: CPT

## 2024-06-14 PROCEDURE — 85610 PROTHROMBIN TIME: CPT

## 2024-06-14 PROCEDURE — 86256 FLUORESCENT ANTIBODY TITER: CPT

## 2024-06-14 PROCEDURE — 82248 BILIRUBIN DIRECT: CPT

## 2024-06-14 PROCEDURE — 85027 COMPLETE CBC AUTOMATED: CPT

## 2024-06-14 PROCEDURE — 81517 LIVER DS ALYS 3 BMRK SRM ALG: CPT

## 2024-06-14 PROCEDURE — 80061 LIPID PANEL: CPT

## 2024-06-14 PROCEDURE — 80053 COMPREHEN METABOLIC PANEL: CPT

## 2024-06-14 PROCEDURE — 82728 ASSAY OF FERRITIN: CPT

## 2024-06-14 PROCEDURE — 83550 IRON BINDING TEST: CPT

## 2024-06-14 PROCEDURE — 82103 ALPHA-1-ANTITRYPSIN TOTAL: CPT

## 2024-06-14 PROCEDURE — 83690 ASSAY OF LIPASE: CPT

## 2024-06-14 PROCEDURE — 82043 UR ALBUMIN QUANTITATIVE: CPT

## 2024-06-14 PROCEDURE — 3078F DIAST BP <80 MM HG: CPT | Performed by: STUDENT IN AN ORGANIZED HEALTH CARE EDUCATION/TRAINING PROGRAM

## 2024-06-14 PROCEDURE — 36415 COLL VENOUS BLD VENIPUNCTURE: CPT

## 2024-06-14 RX ORDER — TIZANIDINE 2 MG/1
2 TABLET ORAL EVERY 6 HOURS PRN
Qty: 30 TABLET | Refills: 0 | Status: SHIPPED | OUTPATIENT
Start: 2024-06-14 | End: 2024-06-24

## 2024-06-14 RX ORDER — MICONAZOLE NITRATE 2 %
CREAM (GRAM) TOPICAL
COMMUNITY
Start: 2024-01-19

## 2024-06-14 RX ORDER — FOLIC ACID 1 MG/1
1 TABLET ORAL
COMMUNITY
Start: 2024-02-21

## 2024-06-14 RX ORDER — SEMAGLUTIDE 0.68 MG/ML
INJECTION, SOLUTION SUBCUTANEOUS
COMMUNITY
Start: 2024-05-21

## 2024-06-14 RX ORDER — NALOXONE HYDROCHLORIDE 4 MG/.1ML
SPRAY NASAL
COMMUNITY
Start: 2024-01-19 | End: 2024-06-14 | Stop reason: WASHOUT

## 2024-06-14 RX ORDER — NALTREXONE HYDROCHLORIDE 50 MG/1
1 TABLET, FILM COATED ORAL
COMMUNITY
Start: 2024-02-26 | End: 2024-06-14 | Stop reason: WASHOUT

## 2024-06-14 RX ORDER — RISPERIDONE 1 MG/1
TABLET ORAL
COMMUNITY
Start: 2024-05-11 | End: 2024-06-14 | Stop reason: WASHOUT

## 2024-06-14 RX ORDER — ONDANSETRON 4 MG/1
TABLET, ORALLY DISINTEGRATING ORAL
COMMUNITY
Start: 2024-05-30 | End: 2024-06-14 | Stop reason: SDUPTHER

## 2024-06-14 RX ORDER — ATORVASTATIN CALCIUM 10 MG/1
TABLET, FILM COATED ORAL
COMMUNITY
Start: 2024-03-19

## 2024-06-14 RX ORDER — MULTIVITAMIN WITH FOLIC ACID 400 MCG
1 TABLET ORAL
COMMUNITY
Start: 2024-01-19

## 2024-06-14 RX ORDER — ONDANSETRON 4 MG/1
TABLET, ORALLY DISINTEGRATING ORAL
Qty: 30 TABLET | Refills: 0 | Status: SHIPPED | OUTPATIENT
Start: 2024-06-14

## 2024-06-14 RX ORDER — ACAMPROSATE CALCIUM 333 MG/1
TABLET, DELAYED RELEASE ORAL
COMMUNITY
Start: 2024-05-28

## 2024-06-14 RX ORDER — LANOLIN ALCOHOL/MO/W.PET/CERES
CREAM (GRAM) TOPICAL
COMMUNITY
Start: 2024-02-21

## 2024-06-14 RX ORDER — BUPROPION HYDROCHLORIDE 150 MG/1
TABLET ORAL
COMMUNITY
Start: 2024-05-11

## 2024-06-14 RX ORDER — INSULIN LISPRO 200 [IU]/ML
INJECTION, SOLUTION SUBCUTANEOUS
COMMUNITY
Start: 2024-03-05

## 2024-06-14 NOTE — PROGRESS NOTES
Joseph Hawkins is a 43 y.o. male seen in Clinic at [facility] [place]. Larry Gorman on 06/14/24 for routine care, as well as for management of the following chronic medical conditions: T2DM, GERD, prior alcohol dependence, chronic pancreatitis, cirrhosis (per imaging) with patient reported history of esophageal varices. Patient presents today for FATIGUE.     He is overdue for follow up and has not followed up with subspecialty providers as advised (notably endocrine, cancelled visit in April recently). Seen by hepatology in 11/2023, though never proceeded with lab workup or imaging as advised.     #Cirrhosis   - History of alcohol abuse; sober since around 2021 for the most part (few slip ups, around 3 weeks worth during that time)   - GI/hepatology referral in November, never followed up; again provided today   [   ] new hepatology referral   - Evidence of recannulization paraumbilical vein consistent with portal HTN on imaging from 11/2022  - Patient notes he did require endoscopy for esophageal varices in the past   - No evidence of decompensation on exam today   [  ] hepatology referral: again stressed importance of establishing with hepatology given findings on imaging and history; would likely benefit from B-Blocker such as carvedilol given history   - Continued alcohol cessation   [  ] meds reviewed; labs today   [  ] liver US and Fibroscan forms printed and encouraged     #T2DM (IDDM)  - Humalog, Semglee, Ozempic (0.25), Metformin 1000mg BID   - Management per endo   - encouraged to keep upcoming follow up visit   - Last A1C 9.7 in 10/2023  - Dr. Escoto in [place] ([phone])  - Patient not currently on ACE/ARB or statin; discuss at next CPE/wellness visit   [  ] labs today     #Chronic Pancreatitis   - from prior alcohol abuse/dependence   - pending new GI/Hepatology referral   - is on GLP-1 per endo of note, which I have some concern with; Lipase toady with labs      #GERD  - Pantoprazole 40mg daily   -  "prior alcohol abuse, sober for almost 3x years     #Neuropathic Pain  - likely multifactorial in setting of alcohol abuse, poorly controlled T2DM  - Gabapentin 300mg TID; previously was using Tizanidine chronically but have successfully transitioned to rare PRN use   - for PRN low back spasm pain, small supply of Tizanidine prescribed today     #Prior Alcohol Abuse  - mostly sober for almost 3x years     Past Medical History: as above   No past medical history on file.  Subspecialty Medical Care: Endo; again recommended hepatology referral today     Past Surgical History: denies   No past surgical history on file.    Medications:  Current Outpatient Medications:     acamprosate (Campral) 333 mg EC tablet, TAKE 2 TABS BY MOUTH THREE TIMES A DAY, Disp: , Rfl:     atorvastatin (Lipitor) 10 mg tablet, , Disp: , Rfl:     buPROPion XL (Wellbutrin XL) 150 mg 24 hr tablet, , Disp: , Rfl:     Daily-David, with folic acid, 400 mcg tablet, Take 1 tablet by mouth once daily with breakfast., Disp: , Rfl:     folic acid (Folvite) 1 mg tablet, Take 1 tablet (1 mg) by mouth once daily with breakfast., Disp: , Rfl:     HumaLOG KwikPen Insulin 200 unit/mL (3 mL) insulin pen pen, , Disp: , Rfl:     nicotine polacrilex (Nicorette) 2 mg gum, TAKE 1 EACH BY MOUTH EVERY 2 HOURS AS NEEDED., Disp: , Rfl:     Ozempic 0.25 mg or 0.5 mg (2 mg/3 mL) pen injector, 0.25MG WEEKLY X 4 WEEKS, THEN INCREASE TO 0.5MG WEEKLY SUBCUTANEOUS 90 DAYS, Disp: , Rfl:     thiamine 100 mg tablet, 1 TABLET BY ORAL/FEEDING TUBE ROUTE THREE TIMES A DAY  DOSES., Disp: , Rfl:     BD Ultra-Fine Short Pen Needle 31 gauge x 5/16\" needle, USE ONCE DAILY AS NEEDED, Disp: , Rfl:     gabapentin (Neurontin) 300 mg capsule, Take 1 capsule (300 mg) by mouth 3 times a day., Disp: 270 capsule, Rfl: 1    HumaLOG KwikPen Insulin 100 unit/mL injection, PLEASE SEE ATTACHED FOR DETAILED DIRECTIONS, Disp: , Rfl:     metFORMIN (Glucophage) 1,000 mg tablet, Take by mouth twice a " day., Disp: , Rfl:     ondansetron ODT (Zofran-ODT) 4 mg disintegrating tablet, DISSOLVE 1 TABLET ON THE TONGUE EVERY 8 HOURS AS NEEDED FOR NAUSEA OR VOMITING., Disp: 30 tablet, Rfl: 0    pantoprazole (ProtoNix) 40 mg EC tablet, TAKE 1 TABLET BY MOUTH EVERY DAY, Disp: 90 tablet, Rfl: 1    Semglee,insulin glarg-yfgn,Pen 100 unit/mL (3 mL) insulin pen, , Disp: , Rfl:     tiZANidine (Zanaflex) 2 mg tablet, Take 1 tablet (2 mg) by mouth every 6 hours if needed for muscle spasms for up to 10 days., Disp: 30 tablet, Rfl: 0  [facility]     Allergies: NKDA  No Known Allergies    Immunizations:   Tdap 2022-->due 2032  PCV eligible given T2DM status, discuss at follow up visit/CPE  Flu, updated COVID vaccines recommended     Family History: no chronic medical conditions that he is aware of   No family history on file.    Social History:   Home/Living Situation: lives at home with wife; feels safe at home; lives in [facility]   Education:   Employment/Work/Vocational: not currently working; previously worked in [employer]   Activities: hobbies include jeff, [employer], fantasy reading   Drug Use: 12mg nicotine but cutting; started smoking middle school; Delta 8 for anxiety (patient states legal in [place]), states it works better for anxiety than other medications (SSRIs, antipsychotics); prior alcohol abuse, almost 3x years sober   Diet: no dietary concerns   Sexuality/Contraception/Menstrual History:    Suicide/Depression/Anxiety: following with psychiatry; deferred behavioral health referral   Sleep: sleep concerns    Transition Processes:  Financial/Health Insurance: has insurance   Transportation: wife helps with transportation   Voting: registered to vote  Legal/Guardian: Wife is emergency contact, Teresa Ross [phone]  Contact Information: [phone]    Visit Vitals  /75   Pulse 81   Wt 91.5 kg (201 lb 12.8 oz)   SpO2 94%   BMI 26.62 kg/m²   Smoking Status Never   BSA 2.17 m²      PHYSICAL  EXAM:   General:  male in NAD, mildly disheveled appearance   HEENT: NCAT, MMM  CV: RRR  PULM: CTAB  ABD: soft, NT, ND, no rebound/guarding, no ascites noted   : no suprapubic or CVA tenderness  EXT: WWP, no edema   NEURO: A&Ox4, no gross motor or sensory deficits     Assessment/Plan    Joseph Hawkins is a 43 y.o. [place] [facility] [place]orman on 06[place]14/24 for routine care, as well as for management of the following chronic medical conditions: T2DM, GERD, prior alcohol dependence, chronic pancreatitis, cirrhosis (per imaging) with patient reported history of esophageal varices. Patient presents today for FATIGUE.     He is overdue for follow up and has not followed up with subspecialty providers as advised (notably endocrine, cancelled visit in April recently). Seen by hepatology in 11/2023, though never proceeded with lab workup or imaging as advised.     #Cirrhosis   - History of alcohol abuse; sober since around 2021 for the most part (few slip ups, around 3 weeks worth during that time)   - GI/hepatology referral in November, never followed up; again provided today   [   ] new hepatology referral   - Evidence of recannulization paraumbilical vein consistent with portal HTN on imaging from 11/2022  - Patient notes he did require endoscopy for esophageal varices in the past   - No evidence of decompensation on exam today   [  ] hepatology referral: again stressed importance of establishing with hepatology given findings on imaging and history; would likely benefit from B-Blocker such as carvedilol given history   - Continued alcohol cessation   [  ] meds reviewed; labs today   [  ] liver US and Fibroscan forms printed and encouraged     #T2DM (IDDM)  - Humalog, Semglee, Ozempic (0.25), Metformin 1000mg BID   - Management per endo   - encouraged to keep upcoming follow up visit   - Last A1C 9.7 in 10/2023  - Dr. Escoto in [place] ([phone])  - Patient not currently on ACE/ARB or  statin; discuss at next CPE/wellness visit   [  ] labs today     #Chronic Pancreatitis   - from prior alcohol abuse/dependence   - pending new GI/Hepatology referral   - is on GLP-1 per endo of note, which I have some concern with; Lipase toady with labs      #GERD  - Pantoprazole 40mg daily   - prior alcohol abuse, sober for almost 3x years     #Neuropathic Pain  - likely multifactorial in setting of alcohol abuse, poorly controlled T2DM  - Gabapentin 300mg TID; previously was using Tizanidine chronically but have successfully transitioned to rare PRN use   - for PRN low back spasm pain, small supply of Tizanidine prescribed today     #Prior Alcohol Abuse  - mostly sober for almost 3x years     #Health Maintenance    Cancer Screening  - Colorectal Cancer Screening: likely requires recurrent EGD (unclear last), hepatology referral recurrently pending   - Lung Cancer Screening: tobacco use, discuss at 50   - Prostate Cancer Screening: due at 55    Laboratory Screening  - Lipid Screen: T2DM, discuss moderate intensity statin at next CPE pending liver function testing would allow   - ASCVD Score: T2DM  - A1C, glucose screen: repeat today   - STI, HIV, Hep B screen: negative 12/2022  - Hep C screen: negative 2022    Imaging Screening  - AAA screening: due at 65  - Osteoporosis/DEXA screening: screening at some point given alcohol history     Immunizations:   - Influenza: annual recommended   - COVID: updated boosters recommended  - Tdap: 2022-->due 2032  - Prevnar, Pneumovax: PCV-20 eligible given T2DM at CPE   - Shingrix: due at 50    Other Screening  - Health Literacy Assessment: poor  - Depression screen: known history, follows with psych   - Home safety/partner violence screen: negative  - Hearing/Vision screens: optho recommended previously   - Alcohol/tobacco/drug use screen: tobacco use, prior alcohol (mostly sober ~3 years)   - Healthcare POA/Advanced Directives: wife    Referrals: Hepatology, endocrine follow  up; labs; care navigator engagement; liver US and fibroscan     Return to clinic in 1-2 months for CPE, sooner if acute issues arise.    Patient Discussion:    Please call back the office with any questions at [phone]. In the case of an emergency, please call 911 or go to the nearest Emergency Department.      Larry Gorman MD  Internal Medicine-Pediatrics  [facility] [address], [address]  P: [phone], F: [phone]

## 2024-06-14 NOTE — PATIENT INSTRUCTIONS
Hepatology referral   Faith Morales, our care navigator, can help you schedule if there are issues  Her phone is [phone]    Labs in [address] today, both blood and urine   Make sure they get the labs ordered by me today AND by Dr. Ennis from 11/17/23    Liver Ultrasound and FIBROSCAN imaging   Please stop in [address] to get these scheduled  Can also call [phone] to schedule if need be     Get records to me from your endocrinologist and psychiatrist  Our fax is [phone]    Follow up with me in next 1-2 months! Schedule this as your annual physical.     Best,  Dr. MEDINA

## 2024-06-17 ENCOUNTER — HOSPITAL ENCOUNTER (OUTPATIENT)
Dept: RADIOLOGY | Facility: CLINIC | Age: 44
Discharge: HOME | End: 2024-06-17
Payer: COMMERCIAL

## 2024-06-17 DIAGNOSIS — R74.8 ELEVATED LIVER ENZYMES: ICD-10-CM

## 2024-06-17 PROCEDURE — 76705 ECHO EXAM OF ABDOMEN: CPT

## 2024-06-17 PROCEDURE — 76705 ECHO EXAM OF ABDOMEN: CPT | Performed by: RADIOLOGY

## 2024-06-18 LAB
ANA SER QL HEP2 SUBST: NEGATIVE
MITOCHONDRIA AB SER QL IF: NEGATIVE

## 2024-06-19 LAB — SMOOTH MUSCLE AB SER QL IF: ABNORMAL

## 2024-06-20 LAB
SCAN RESULT: NORMAL
VIT B1 PYROPHOSHATE BLD-SCNC: 165 NMOL/L (ref 70–180)

## 2024-07-11 DIAGNOSIS — R74.8 ELEVATED LIVER ENZYMES: Primary | ICD-10-CM

## 2024-07-11 DIAGNOSIS — K86.9 PANCREATIC LESION (HHS-HCC): ICD-10-CM

## 2024-07-26 ENCOUNTER — CLINICAL SUPPORT (OUTPATIENT)
Dept: GASTROENTEROLOGY | Facility: HOSPITAL | Age: 44
End: 2024-07-26
Payer: COMMERCIAL

## 2024-07-26 DIAGNOSIS — R74.8 ELEVATED LIVER ENZYMES: ICD-10-CM

## 2024-08-01 ENCOUNTER — APPOINTMENT (OUTPATIENT)
Dept: RADIOLOGY | Facility: CLINIC | Age: 44
End: 2024-08-01
Payer: COMMERCIAL

## 2024-08-01 ENCOUNTER — HOSPITAL ENCOUNTER (OUTPATIENT)
Dept: RADIOLOGY | Facility: CLINIC | Age: 44
Discharge: HOME | End: 2024-08-01
Payer: COMMERCIAL

## 2024-08-01 DIAGNOSIS — K86.9 PANCREATIC LESION (HHS-HCC): ICD-10-CM

## 2024-08-01 PROCEDURE — A9575 INJ GADOTERATE MEGLUMI 0.1ML: HCPCS | Performed by: STUDENT IN AN ORGANIZED HEALTH CARE EDUCATION/TRAINING PROGRAM

## 2024-08-01 PROCEDURE — 2550000001 HC RX 255 CONTRASTS: Performed by: STUDENT IN AN ORGANIZED HEALTH CARE EDUCATION/TRAINING PROGRAM

## 2024-08-01 PROCEDURE — 74183 MRI ABD W/O CNTR FLWD CNTR: CPT

## 2024-08-01 RX ORDER — GADOTERATE MEGLUMINE 376.9 MG/ML
18 INJECTION INTRAVENOUS
Status: COMPLETED | OUTPATIENT
Start: 2024-08-01 | End: 2024-08-01

## 2024-08-02 ENCOUNTER — APPOINTMENT (OUTPATIENT)
Dept: GASTROENTEROLOGY | Facility: HOSPITAL | Age: 44
End: 2024-08-02
Payer: COMMERCIAL

## 2024-08-06 ENCOUNTER — TELEPHONE (OUTPATIENT)
Dept: GASTROENTEROLOGY | Facility: CLINIC | Age: 44
End: 2024-08-06
Payer: COMMERCIAL

## 2024-08-06 NOTE — TELEPHONE ENCOUNTER
----- Message from Jojo Hernandez sent at 8/2/2024  4:11 PM EDT -----    ----- Message -----  From: Ubaldo Ennis MD  Sent: 8/2/2024   2:21 PM EDT  To:  [id number] Clerical    Hi ladies, Mr. Ruiz needs follow-up in November, thank you

## 2024-08-09 DIAGNOSIS — M62.838 MUSCLE SPASM: ICD-10-CM

## 2024-08-09 RX ORDER — TIZANIDINE 2 MG/1
2 TABLET ORAL EVERY 6 HOURS PRN
Qty: 30 TABLET | Refills: 0 | Status: SHIPPED | OUTPATIENT
Start: 2024-08-09 | End: 2024-08-19

## 2024-10-22 ENCOUNTER — APPOINTMENT (OUTPATIENT)
Dept: PRIMARY CARE | Facility: CLINIC | Age: 44
End: 2024-10-22
Payer: COMMERCIAL

## 2024-10-22 VITALS
OXYGEN SATURATION: 98 % | WEIGHT: 212.4 LBS | SYSTOLIC BLOOD PRESSURE: 110 MMHG | HEART RATE: 64 BPM | DIASTOLIC BLOOD PRESSURE: 77 MMHG | HEIGHT: 73 IN | BODY MASS INDEX: 28.15 KG/M2

## 2024-10-22 DIAGNOSIS — Z79.4 TYPE 2 DIABETES MELLITUS WITH OTHER SPECIFIED COMPLICATION, WITH LONG-TERM CURRENT USE OF INSULIN: ICD-10-CM

## 2024-10-22 DIAGNOSIS — Z23 IMMUNIZATION DUE: ICD-10-CM

## 2024-10-22 DIAGNOSIS — E11.69 TYPE 2 DIABETES MELLITUS WITH OTHER SPECIFIED COMPLICATION, WITH LONG-TERM CURRENT USE OF INSULIN: ICD-10-CM

## 2024-10-22 DIAGNOSIS — Z00.00 HEALTH MAINTENANCE EXAMINATION: Primary | ICD-10-CM

## 2024-10-22 DIAGNOSIS — M79.2 NEUROPATHIC PAIN: ICD-10-CM

## 2024-10-22 DIAGNOSIS — R35.0 URINARY FREQUENCY: ICD-10-CM

## 2024-10-22 DIAGNOSIS — M62.838 MUSCLE SPASM: ICD-10-CM

## 2024-10-22 DIAGNOSIS — K70.30 ALCOHOLIC CIRRHOSIS OF LIVER WITHOUT ASCITES (MULTI): ICD-10-CM

## 2024-10-22 DIAGNOSIS — Z12.5 PROSTATE CANCER SCREENING: ICD-10-CM

## 2024-10-22 PROCEDURE — 90471 IMMUNIZATION ADMIN: CPT | Performed by: STUDENT IN AN ORGANIZED HEALTH CARE EDUCATION/TRAINING PROGRAM

## 2024-10-22 PROCEDURE — 3078F DIAST BP <80 MM HG: CPT | Performed by: STUDENT IN AN ORGANIZED HEALTH CARE EDUCATION/TRAINING PROGRAM

## 2024-10-22 PROCEDURE — 3048F LDL-C <100 MG/DL: CPT | Performed by: STUDENT IN AN ORGANIZED HEALTH CARE EDUCATION/TRAINING PROGRAM

## 2024-10-22 PROCEDURE — 3008F BODY MASS INDEX DOCD: CPT | Performed by: STUDENT IN AN ORGANIZED HEALTH CARE EDUCATION/TRAINING PROGRAM

## 2024-10-22 PROCEDURE — 3074F SYST BP LT 130 MM HG: CPT | Performed by: STUDENT IN AN ORGANIZED HEALTH CARE EDUCATION/TRAINING PROGRAM

## 2024-10-22 PROCEDURE — 3046F HEMOGLOBIN A1C LEVEL >9.0%: CPT | Performed by: STUDENT IN AN ORGANIZED HEALTH CARE EDUCATION/TRAINING PROGRAM

## 2024-10-22 PROCEDURE — 90739 HEPB VACC 2/4 DOSE ADULT IM: CPT | Performed by: STUDENT IN AN ORGANIZED HEALTH CARE EDUCATION/TRAINING PROGRAM

## 2024-10-22 PROCEDURE — 3061F NEG MICROALBUMINURIA REV: CPT | Performed by: STUDENT IN AN ORGANIZED HEALTH CARE EDUCATION/TRAINING PROGRAM

## 2024-10-22 PROCEDURE — 99396 PREV VISIT EST AGE 40-64: CPT | Performed by: STUDENT IN AN ORGANIZED HEALTH CARE EDUCATION/TRAINING PROGRAM

## 2024-10-22 RX ORDER — TIZANIDINE 2 MG/1
2 TABLET ORAL EVERY 6 HOURS PRN
Qty: 60 TABLET | Refills: 0 | Status: SHIPPED | OUTPATIENT
Start: 2024-10-22 | End: 2024-11-21

## 2024-10-22 RX ORDER — ESKETAMINE HYDROCHLORIDE 28 MG/.2ML
42 SOLUTION NASAL
COMMUNITY
End: 2024-10-22 | Stop reason: WASHOUT

## 2024-10-22 RX ORDER — GABAPENTIN 300 MG/1
300 CAPSULE ORAL 3 TIMES DAILY
Qty: 270 CAPSULE | Refills: 1 | Status: SHIPPED | OUTPATIENT
Start: 2024-10-22 | End: 2025-04-20

## 2024-10-22 ASSESSMENT — PATIENT HEALTH QUESTIONNAIRE - PHQ9
2. FEELING DOWN, DEPRESSED OR HOPELESS: NEARLY EVERY DAY
10. IF YOU CHECKED OFF ANY PROBLEMS, HOW DIFFICULT HAVE THESE PROBLEMS MADE IT FOR YOU TO DO YOUR WORK, TAKE CARE OF THINGS AT HOME, OR GET ALONG WITH OTHER PEOPLE: VERY DIFFICULT
3. TROUBLE FALLING OR STAYING ASLEEP OR SLEEPING TOO MUCH: NEARLY EVERY DAY
4. FEELING TIRED OR HAVING LITTLE ENERGY: MORE THAN HALF THE DAYS
9. THOUGHTS THAT YOU WOULD BE BETTER OFF DEAD, OR OF HURTING YOURSELF: NOT AT ALL
SUM OF ALL RESPONSES TO PHQ QUESTIONS 1-9: 17
SUM OF ALL RESPONSES TO PHQ9 QUESTIONS 1 AND 2: 6
5. POOR APPETITE OR OVEREATING: SEVERAL DAYS
1. LITTLE INTEREST OR PLEASURE IN DOING THINGS: NEARLY EVERY DAY
6. FEELING BAD ABOUT YOURSELF - OR THAT YOU ARE A FAILURE OR HAVE LET YOURSELF OR YOUR FAMILY DOWN: NEARLY EVERY DAY
7. TROUBLE CONCENTRATING ON THINGS, SUCH AS READING THE NEWSPAPER OR WATCHING TELEVISION: MORE THAN HALF THE DAYS
8. MOVING OR SPEAKING SO SLOWLY THAT OTHER PEOPLE COULD HAVE NOTICED. OR THE OPPOSITE, BEING SO FIGETY OR RESTLESS THAT YOU HAVE BEEN MOVING AROUND A LOT MORE THAN USUAL: NOT AT ALL

## 2024-10-22 ASSESSMENT — PAIN SCALES - GENERAL: PAINLEVEL_OUTOF10: 0-NO PAIN

## 2024-10-22 NOTE — PROGRESS NOTES
Joseph Hawkins is a 44 y.o. male [place] in Clinic at [facility] [place] Larry Gorman on 10/22/24 for routine care, as well as for management of the following chronic medical conditions: T2DM, GERD, prior alcohol dependence, chronic pancreatitis, cirrhosis (per imaging) with patient reported history of esophageal varices. Patient presents today for CPE.     #Cirrhosis   - History of alcohol abuse; sober since around 2021 for the most part (few slip ups, around 3 weeks worth during that time)   - GI/hepatology referral in November 2023, never followed up; again provided today   [   ] follow up advised: again stressed importance of establishing with hepatology given findings on imaging and history; would likely benefit from B-Blocker such as carvedilol given history (will defer for now given low resting HR, well controlled BP, until able to verify/likely needs updated endoscopy to assess)   - Evidence of recannulization paraumbilical vein consistent with portal HTN on imaging from 11/2022  - Patient notes he did require endoscopy for esophageal varices in the past   - No evidence of decompensation on exam today   - Continued alcohol cessation   - Labs from 06/2024 reviewed: largely reassuring, notable for weakly positive Anti-Smooth Muscle antibody, otherwise reassuring, negative Hep B titers  [  ] Hep B vaccine  - MR completed with follow up advised every 6 months   [  ] printed out requisition for MR to be completed early 02/2025  [  ] consider referral (Dr. Madden) for EUS/FNA if any interval growth     #T2DM (IDDM)  - Humalog, Semglee, Metformin 1000mg BID   - Management per endo   - encouraged to keep upcoming follow up visit: pending later today   - Last A1C 9.2 in 06/2024  - Dr. Escoto in [place] ([phone])  - Patient not currently on ACE/ARB or statin  [  ] updated labs today    #Chronic Pancreatitis   - from prior alcohol abuse/dependence   - pending GI/Hepatology follow up    - would avoid GLP-1   - no  "symptoms of chronic diarrhea   [  ] imaging surveillance/follow up, next in 02/2025     #GERD  - Pantoprazole 40mg daily   - prior alcohol abuse, sober for almost 3x years     #Neuropathic Pain  - likely multifactorial in setting of alcohol abuse, poorly controlled T2DM  - Gabapentin 300mg TID; previously was using Tizanidine chronically but have successfully transitioned to rare PRN use   - for PRN low back spasm pain, small supply of Tizanidine prescribed today     #Prior Alcohol Abuse  - mostly sober for almost 3x years     #Depression/Anxiety   - Esketamine twice weekly 84mg, Wellbutrin   - follows with psychiatry   - has noted benefits     Past Medical History: as above   No past medical history on file.  Subspecialty Medical Care: Endo; again recommended hepatology referral today     Past Surgical History: denies   No past surgical history on file.    Medications:  Current Outpatient Medications:     BD Ultra-Fine Short Pen Needle 31 gauge x 5/16\" needle, USE ONCE DAILY AS NEEDED, Disp: , Rfl:     buPROPion XL (Wellbutrin XL) 150 mg 24 hr tablet, , Disp: , Rfl:     Daily-David, with folic acid, 400 mcg tablet, Take 1 tablet by mouth once daily with breakfast., Disp: , Rfl:     folic acid (Folvite) 1 mg tablet, Take 1 tablet (1 mg) by mouth once daily with breakfast., Disp: , Rfl:     HumaLOG KwikPen Insulin 100 unit/mL injection, PLEASE SEE ATTACHED FOR DETAILED DIRECTIONS, Disp: , Rfl:     HumaLOG KwikPen Insulin 200 unit/mL (3 mL) insulin pen pen, , Disp: , Rfl:     metFORMIN (Glucophage) 1,000 mg tablet, Take by mouth twice a day., Disp: , Rfl:     nicotine polacrilex (Nicorette) 2 mg gum, TAKE 1 EACH BY MOUTH EVERY 2 HOURS AS NEEDED., Disp: , Rfl:     ondansetron ODT (Zofran-ODT) 4 mg disintegrating tablet, DISSOLVE 1 TABLET ON THE TONGUE EVERY 8 HOURS AS NEEDED FOR NAUSEA OR VOMITING., Disp: 30 tablet, Rfl: 0    pantoprazole (ProtoNix) 40 mg EC tablet, TAKE 1 TABLET BY MOUTH EVERY DAY, Disp: 90 tablet, " Rfl: 1    Semglee,insulin glarg-yfgn,Pen 100 unit/mL (3 mL) insulin pen, , Disp: , Rfl:     thiamine 100 mg tablet, 1 TABLET BY ORAL/FEEDING TUBE ROUTE THREE TIMES A DAY  DOSES., Disp: , Rfl:     acamprosate (Campral) 333 mg EC tablet, TAKE 2 TABS BY MOUTH THREE TIMES A DAY, Disp: , Rfl:     atorvastatin (Lipitor) 10 mg tablet, , Disp: , Rfl:     gabapentin (Neurontin) 300 mg capsule, Take 1 capsule (300 mg) by mouth 3 times a day., Disp: 270 capsule, Rfl: 1    tiZANidine (Zanaflex) 2 mg tablet, Take 1 tablet (2 mg) by mouth every 6 hours if needed for muscle spasms., Disp: 60 tablet, Rfl: 0  [facility]     Allergies: NKDA  No Known Allergies    Immunizations:   Tdap 2022-->due 2032  PCV eligible given T2DM status, discuss at follow up visit/CPE: he defers today   Flu, updated COVID vaccines recommended: he defers today   [  ] Hep B dose #1 today     Family History: no chronic medical conditions that he is aware of   No family history on file.    Social History:   Home/Living Situation: lives at home with wife; feels safe at home; lives in [facility]   Education:   Employment/Work/Vocational: not currently working; previously worked in [employer]   Activities: hobbies include jeff, paint [employer], fantasy reading   Drug Use: 12mg nicotine but cutting; started smoking middle school; Delta 8 for anxiety (patient states legal in [place]), states it works better for anxiety than other medications (SSRIs, antipsychotics); prior alcohol abuse, almost 3x years sober   Diet: no dietary concerns   Sexuality/Contraception/Menstrual History:    Suicide/Depression/Anxiety: following with psychiatry; deferred behavioral health referral   Sleep: sleep concerns    Transition Processes:  Financial/Health Insurance: has insurance   Transportation: wife helps with transportation   Voting: registered to vote  Legal/Guardian: Wife is emergency contact, Teresachema Hawkins [phone]  Contact Information:  "[phone]    Visit Vitals  /77 (BP Location: Left arm, Patient Position: Sitting, BP Cuff Size: Adult)   Pulse 64   Ht 1.854 m (6' 1\")   Wt 96.3 kg (212 lb 6.4 oz)   SpO2 98%   BMI 28.02 kg/m²   Smoking Status Never   BSA 2.23 m²      PHYSICAL EXAM:   General:  male in NAD, mildly disheveled appearance   HEENT: NCAT, MMM  CV: RRR  PULM: CTAB  ABD: soft, NT, ND, no rebound/guarding, no ascites noted   : no suprapubic or CVA tenderness  EXT: WWP, no edema   NEURO: A&Ox4, no gross motor or sensory deficits , no asterixis     Assessment/Plan    Joseph Hawkins is a 44 y.o. male seen in Clinic at [facility] [place]. Larry Gorman on 10/22/24 for routine care, as well as for management of the following chronic medical conditions: T2DM, GERD, prior alcohol dependence, chronic pancreatitis, cirrhosis (per imaging) with patient reported history of esophageal varices. Patient presents today for CPE.     #Cirrhosis   - History of alcohol abuse; sober since around 2021 for the most part (few slip ups, around 3 weeks worth during that time)   - GI/hepatology referral in November 2023, never followed up; again provided today   [   ] follow up advised: again stressed importance of establishing with hepatology given findings on imaging and history; would likely benefit from B-Blocker such as carvedilol given history (will defer for now given low resting HR, well controlled BP, until able to verify/likely needs updated endoscopy to assess)   - Evidence of recannulization paraumbilical vein consistent with portal HTN on imaging from 11/2022  - Patient notes he did require endoscopy for esophageal varices in the past   - No evidence of decompensation on exam today   - Continued alcohol cessation   - Labs from 06/2024 reviewed: largely reassuring, notable for weakly positive Anti-Smooth Muscle antibody, otherwise reassuring, negative Hep B titers  [  ] Hep B vaccine  - MR completed with follow up advised every 6 months   [ "  ] printed out requisition for MR to be completed early 02/2025  [  ] consider referral (Dr. Madden) for EUS/FNA if any interval growth     #T2DM (IDDM)  - Humalog, Semglee, Metformin 1000mg BID   - Management per endo   - encouraged to keep upcoming follow up visit: pending later today   - Last A1C 9.2 in 06/2024  - Dr. Escoto in [place] ([phone])  - Patient not currently on ACE/ARB or statin  [  ] updated labs today    #Chronic Pancreatitis   - from prior alcohol abuse/dependence   - pending GI/Hepatology follow up    - would avoid GLP-1   - no symptoms of chronic diarrhea   [  ] imaging surveillance/follow up, next in 02/2025     #GERD  - Pantoprazole 40mg daily   - prior alcohol abuse, sober for almost 3x years     #Neuropathic Pain  - likely multifactorial in setting of alcohol abuse, poorly controlled T2DM  - Gabapentin 300mg TID; previously was using Tizanidine chronically but have successfully transitioned to rare PRN use   - for PRN low back spasm pain, small supply of Tizanidine prescribed today     #Prior Alcohol Abuse  - mostly sober for almost 3x years     #Depression/Anxiety   - Esketamine twice weekly 84mg, Wellbutrin   - follows with psychiatry   - has noted benefits     #Health Maintenance    Cancer Screening  - Colorectal Cancer Screening: likely requires recurrent EGD (unclear last), hepatology follow up advised   - Lung Cancer Screening: tobacco use, discuss at 50   - Prostate Cancer Screening: ORDERED TODAY given symptoms     Laboratory Screening  - Lipid Screen: T2DM, consider moderate intensity statin pending clinical course (though lipids with LDL already < 70)   - ASCVD Score: T2DM  - A1C, glucose screen: repeat today   - STI, HIV, Hep B screen: negative 12/2022  - Hep C screen: negative 2022    Imaging Screening  - AAA screening: due at 65  - Osteoporosis/DEXA screening: screening at some point given alcohol history     Immunizations:   - Influenza: defers   - COVID: updated  boosters recommended  - Tdap: 2022-->due 2032  - Prevnar, Pneumovax: PCV-20 eligible given T2DM at CPE - DEFERS   - Shingrix: due at 50  - Hep B today dose #1    Other Screening  - Health Literacy Assessment: improving   - Depression screen: known history, follows with psych   - Home safety/partner violence screen: negative  - Hearing/Vision screens: optho recommended previously   - Alcohol/tobacco/drug use screen: tobacco use, prior alcohol (mostly sober ~3 years)   - Healthcare POA/Advanced Directives: wife    Referrals: Hepatology follow up, endocrine follow up; labs; labs; MRI; Hep B dose #1     Return to clinic in 3 months for follow up    Patient Discussion:    Please call back the office with any questions at [phone]. In the case of an emergency, please call [phone] or go to the nearest Emergency Department.      Larry Gorman MD  Internal Medicine-Pediatrics  [facility] [place] [address] [place] [address], [address]  P: [phone], F: [phone]

## 2024-10-22 NOTE — PATIENT INSTRUCTIONS
Thank you for coming in today!    Medication refills provided.     Labs in [address] (including the PSA and urine testing given your symptoms)     Please call to schedule follow up with Dr. Ennis ([phone])  Schedule MRI for sometime in February ([phone])     Follow up with Endocrinology as planned  They can fax records to my office at [phone]    First Hepatitis B shot today   You are eligible for Flu vaccine, COVID booster, and Pneumonia shot as well if you are interested     Follow up in 3 months!    Best,  Dr. MEDINA

## 2024-10-30 DIAGNOSIS — M62.838 MUSCLE SPASM: ICD-10-CM

## 2024-10-30 RX ORDER — TIZANIDINE 2 MG/1
2 TABLET ORAL DAILY PRN
Qty: 90 TABLET | Refills: 1 | Status: SHIPPED | OUTPATIENT
Start: 2024-10-30 | End: 2025-04-28

## 2024-11-02 ENCOUNTER — LAB (OUTPATIENT)
Dept: LAB | Facility: LAB | Age: 44
End: 2024-11-02
Payer: COMMERCIAL

## 2024-11-02 DIAGNOSIS — E11.69 TYPE 2 DIABETES MELLITUS WITH OTHER SPECIFIED COMPLICATION, WITH LONG-TERM CURRENT USE OF INSULIN: ICD-10-CM

## 2024-11-02 DIAGNOSIS — Z79.4 TYPE 2 DIABETES MELLITUS WITH OTHER SPECIFIED COMPLICATION, WITH LONG-TERM CURRENT USE OF INSULIN: ICD-10-CM

## 2024-11-02 DIAGNOSIS — K70.30 ALCOHOLIC CIRRHOSIS OF LIVER WITHOUT ASCITES (MULTI): ICD-10-CM

## 2024-11-02 DIAGNOSIS — Z12.5 PROSTATE CANCER SCREENING: ICD-10-CM

## 2024-11-02 DIAGNOSIS — R35.0 URINARY FREQUENCY: ICD-10-CM

## 2024-11-02 LAB
ALBUMIN SERPL BCP-MCNC: 4.6 G/DL (ref 3.4–5)
ALP SERPL-CCNC: 46 U/L (ref 33–120)
ALT SERPL W P-5'-P-CCNC: 30 U/L (ref 10–52)
ANION GAP SERPL CALC-SCNC: 13 MMOL/L (ref 10–20)
AST SERPL W P-5'-P-CCNC: 26 U/L (ref 9–39)
BASOPHILS # BLD AUTO: 0.05 X10*3/UL (ref 0–0.1)
BASOPHILS NFR BLD AUTO: 0.9 %
BILIRUB SERPL-MCNC: 0.5 MG/DL (ref 0–1.2)
BUN SERPL-MCNC: 14 MG/DL (ref 6–23)
CALCIUM SERPL-MCNC: 9.8 MG/DL (ref 8.6–10.6)
CHLORIDE SERPL-SCNC: 102 MMOL/L (ref 98–107)
CO2 SERPL-SCNC: 29 MMOL/L (ref 21–32)
CREAT SERPL-MCNC: 0.88 MG/DL (ref 0.5–1.3)
EGFRCR SERPLBLD CKD-EPI 2021: >90 ML/MIN/1.73M*2
EOSINOPHIL # BLD AUTO: 0.13 X10*3/UL (ref 0–0.7)
EOSINOPHIL NFR BLD AUTO: 2.4 %
ERYTHROCYTE [DISTWIDTH] IN BLOOD BY AUTOMATED COUNT: 13 % (ref 11.5–14.5)
EST. AVERAGE GLUCOSE BLD GHB EST-MCNC: 203 MG/DL
GLUCOSE SERPL-MCNC: 222 MG/DL (ref 74–99)
HBA1C MFR BLD: 8.7 %
HCT VFR BLD AUTO: 44.5 % (ref 41–52)
HGB BLD-MCNC: 14.3 G/DL (ref 13.5–17.5)
IMM GRANULOCYTES # BLD AUTO: 0.05 X10*3/UL (ref 0–0.7)
IMM GRANULOCYTES NFR BLD AUTO: 0.9 % (ref 0–0.9)
LYMPHOCYTES # BLD AUTO: 0.99 X10*3/UL (ref 1.2–4.8)
LYMPHOCYTES NFR BLD AUTO: 18 %
MCH RBC QN AUTO: 31.2 PG (ref 26–34)
MCHC RBC AUTO-ENTMCNC: 32.1 G/DL (ref 32–36)
MCV RBC AUTO: 97 FL (ref 80–100)
MONOCYTES # BLD AUTO: 0.44 X10*3/UL (ref 0.1–1)
MONOCYTES NFR BLD AUTO: 8 %
NEUTROPHILS # BLD AUTO: 3.85 X10*3/UL (ref 1.2–7.7)
NEUTROPHILS NFR BLD AUTO: 69.8 %
NRBC BLD-RTO: 0 /100 WBCS (ref 0–0)
PLATELET # BLD AUTO: 177 X10*3/UL (ref 150–450)
POTASSIUM SERPL-SCNC: 4.3 MMOL/L (ref 3.5–5.3)
PROT SERPL-MCNC: 7.3 G/DL (ref 6.4–8.2)
RBC # BLD AUTO: 4.59 X10*6/UL (ref 4.5–5.9)
SODIUM SERPL-SCNC: 140 MMOL/L (ref 136–145)
WBC # BLD AUTO: 5.5 X10*3/UL (ref 4.4–11.3)

## 2024-11-02 PROCEDURE — 86015 ACTIN ANTIBODY EACH: CPT

## 2024-11-02 PROCEDURE — 86256 FLUORESCENT ANTIBODY TITER: CPT

## 2024-11-02 PROCEDURE — 85025 COMPLETE CBC W/AUTO DIFF WBC: CPT

## 2024-11-02 PROCEDURE — 83036 HEMOGLOBIN GLYCOSYLATED A1C: CPT

## 2024-11-02 PROCEDURE — 81003 URINALYSIS AUTO W/O SCOPE: CPT

## 2024-11-02 PROCEDURE — 80053 COMPREHEN METABOLIC PANEL: CPT

## 2024-11-03 LAB
APPEARANCE UR: CLEAR
BILIRUB UR STRIP.AUTO-MCNC: NEGATIVE MG/DL
COLOR UR: ABNORMAL
GLUCOSE UR STRIP.AUTO-MCNC: ABNORMAL MG/DL
KETONES UR STRIP.AUTO-MCNC: NEGATIVE MG/DL
LEUKOCYTE ESTERASE UR QL STRIP.AUTO: NEGATIVE
NITRITE UR QL STRIP.AUTO: NEGATIVE
PH UR STRIP.AUTO: 7 [PH]
PROT UR STRIP.AUTO-MCNC: NEGATIVE MG/DL
RBC # UR STRIP.AUTO: NEGATIVE /UL
SP GR UR STRIP.AUTO: 1.02
UROBILINOGEN UR STRIP.AUTO-MCNC: NORMAL MG/DL

## 2024-11-05 LAB
PSA FREE MFR SERPL: <100 %
PSA FREE SERPL-MCNC: <0.1 NG/ML
PSA SERPL IA-MCNC: 0.1 NG/ML (ref 0–4)
SMOOTH MUSCLE AB SER QL IF: ABNORMAL

## 2024-12-08 ENCOUNTER — TELEMEDICINE (OUTPATIENT)
Dept: PRIMARY CARE | Facility: CLINIC | Age: 44
End: 2024-12-08
Payer: COMMERCIAL

## 2024-12-08 DIAGNOSIS — M25.562 ACUTE PAIN OF LEFT KNEE: Primary | ICD-10-CM

## 2024-12-08 PROCEDURE — 99213 OFFICE O/P EST LOW 20 MIN: CPT | Performed by: NURSE PRACTITIONER

## 2024-12-08 ASSESSMENT — ENCOUNTER SYMPTOMS
ARTHRALGIAS: 1
RESPIRATORY NEGATIVE: 1
CONSTITUTIONAL NEGATIVE: 1

## 2024-12-08 NOTE — PROGRESS NOTES
"Patient was trying to push his car out of the snow, three days ago. He slipped a little. He started feeling pain in the left knee immediately. Pt never fell. It hurts to bend the knee, straighten it and put weight on it. It feels like something is \"catching it\". Pt has been using rest, ice and elevation.          Review of Systems   Constitutional: Negative.    HENT: Negative.     Respiratory: Negative.     Musculoskeletal:  Positive for arthralgias.     Objective   There were no vitals taken for this visit.    Physical Exam  Constitutional:       Appearance: Normal appearance.   Pulmonary:      Effort: Pulmonary effort is normal.   Neurological:      Mental Status: He is alert.     Physical exam limited due to the nature of the visit    Assessment/Plan   Problem List Items Addressed This Visit    None  Visit Diagnoses         Codes    Acute pain of left knee    -  Primary M25.562    Relevant Orders    Referral to Orthopaedic Surgery        This visit was completed via Epic. All issues as below were discussed and addressed but no physical exam was performed. If it was felt that the patient should be evaluated in the clinic then they were directed there. The patient verbally consented to the visit. The patient confirmed that he is in the [facility] for this visit.      Approximately 15 minutes spent performing virtual video visit.     Patient says that he would prefer to be seen in person at the urgent care for his injury for xray and further evaluation. Agreed and pt to go to the urgent care. Placed referral to orthopedic doctor for pt and he will make appt there as well. No further questions per pt.        "

## 2024-12-14 DIAGNOSIS — K21.9 GASTROESOPHAGEAL REFLUX DISEASE, UNSPECIFIED WHETHER ESOPHAGITIS PRESENT: ICD-10-CM

## 2024-12-15 RX ORDER — PANTOPRAZOLE SODIUM 40 MG/1
40 TABLET, DELAYED RELEASE ORAL DAILY
Qty: 90 TABLET | Refills: 1 | Status: SHIPPED | OUTPATIENT
Start: 2024-12-15

## 2025-01-06 DIAGNOSIS — R11.0 NAUSEA: ICD-10-CM

## 2025-01-06 RX ORDER — ONDANSETRON 4 MG/1
TABLET, ORALLY DISINTEGRATING ORAL
Qty: 30 TABLET | Refills: 0 | Status: SHIPPED | OUTPATIENT
Start: 2025-01-06 | End: 2025-01-09 | Stop reason: SDUPTHER

## 2025-01-09 DIAGNOSIS — R11.0 NAUSEA: ICD-10-CM

## 2025-01-09 RX ORDER — ONDANSETRON 4 MG/1
TABLET, ORALLY DISINTEGRATING ORAL
Qty: 30 TABLET | Refills: 0 | Status: SHIPPED | OUTPATIENT
Start: 2025-01-09

## 2025-02-03 ENCOUNTER — HOSPITAL ENCOUNTER (OUTPATIENT)
Dept: RADIOLOGY | Facility: CLINIC | Age: 45
Discharge: HOME | End: 2025-02-03
Payer: COMMERCIAL

## 2025-02-03 DIAGNOSIS — K86.9 PANCREATIC LESION (HHS-HCC): ICD-10-CM

## 2025-02-03 PROCEDURE — 74183 MRI ABD W/O CNTR FLWD CNTR: CPT

## 2025-02-03 PROCEDURE — A9575 INJ GADOTERATE MEGLUMI 0.1ML: HCPCS | Performed by: STUDENT IN AN ORGANIZED HEALTH CARE EDUCATION/TRAINING PROGRAM

## 2025-02-03 PROCEDURE — 2550000001 HC RX 255 CONTRASTS: Performed by: STUDENT IN AN ORGANIZED HEALTH CARE EDUCATION/TRAINING PROGRAM

## 2025-02-03 RX ORDER — GADOTERATE MEGLUMINE 376.9 MG/ML
20 INJECTION INTRAVENOUS
Status: COMPLETED | OUTPATIENT
Start: 2025-02-03 | End: 2025-02-03

## 2025-02-03 RX ADMIN — GADOTERATE MEGLUMINE 20 ML: 376.9 INJECTION INTRAVENOUS at 09:09

## 2025-02-05 ENCOUNTER — TELEPHONE (OUTPATIENT)
Dept: GASTROENTEROLOGY | Facility: CLINIC | Age: 45
End: 2025-02-05
Payer: COMMERCIAL

## 2025-02-05 NOTE — TELEPHONE ENCOUNTER
Spoke with patient in order to schedule a follow up visit in June 2025, per Dr. Ennis. Patient stated that he would call back to set up an appointment after he had talked to his wife. Documenting.

## 2025-02-08 DIAGNOSIS — M79.2 NEUROPATHIC PAIN: ICD-10-CM

## 2025-02-08 RX ORDER — GABAPENTIN 300 MG/1
300 CAPSULE ORAL 3 TIMES DAILY
Qty: 270 CAPSULE | Refills: 3 | Status: SHIPPED | OUTPATIENT
Start: 2025-02-08

## 2025-04-05 DIAGNOSIS — K21.9 GASTROESOPHAGEAL REFLUX DISEASE, UNSPECIFIED WHETHER ESOPHAGITIS PRESENT: ICD-10-CM

## 2025-04-05 DIAGNOSIS — M62.838 MUSCLE SPASM: ICD-10-CM

## 2025-04-07 RX ORDER — PANTOPRAZOLE SODIUM 40 MG/1
40 TABLET, DELAYED RELEASE ORAL DAILY
Qty: 90 TABLET | Refills: 1 | Status: SHIPPED | OUTPATIENT
Start: 2025-04-07

## 2025-04-07 RX ORDER — TIZANIDINE 2 MG/1
2 TABLET ORAL DAILY PRN
Qty: 90 TABLET | Refills: 1 | Status: SHIPPED | OUTPATIENT
Start: 2025-04-07 | End: 2025-10-04

## 2025-05-30 DIAGNOSIS — R11.0 NAUSEA: ICD-10-CM

## 2025-05-30 RX ORDER — ONDANSETRON 4 MG/1
TABLET, ORALLY DISINTEGRATING ORAL
Qty: 9 TABLET | Refills: 3 | Status: SHIPPED | OUTPATIENT
Start: 2025-05-30

## 2025-08-13 ENCOUNTER — HOSPITAL ENCOUNTER (EMERGENCY)
Facility: HOSPITAL | Age: 45
Discharge: HOME | End: 2025-08-13
Attending: INTERNAL MEDICINE
Payer: COMMERCIAL

## 2025-08-13 VITALS
BODY MASS INDEX: 29.16 KG/M2 | DIASTOLIC BLOOD PRESSURE: 86 MMHG | HEIGHT: 73 IN | TEMPERATURE: 98 F | HEART RATE: 86 BPM | OXYGEN SATURATION: 98 % | SYSTOLIC BLOOD PRESSURE: 146 MMHG | RESPIRATION RATE: 16 BRPM | WEIGHT: 220 LBS

## 2025-08-13 DIAGNOSIS — K04.7 DENTAL INFECTION: Primary | ICD-10-CM

## 2025-08-13 LAB
ALBUMIN SERPL BCP-MCNC: 4.6 G/DL (ref 3.4–5)
ALP SERPL-CCNC: 48 U/L (ref 33–120)
ALT SERPL W P-5'-P-CCNC: 31 U/L (ref 10–52)
ANION GAP SERPL CALC-SCNC: 16 MMOL/L (ref 10–20)
AST SERPL W P-5'-P-CCNC: 42 U/L (ref 9–39)
BASOPHILS # BLD AUTO: 0.03 X10*3/UL (ref 0–0.1)
BASOPHILS NFR BLD AUTO: 0.6 %
BILIRUB SERPL-MCNC: 0.6 MG/DL (ref 0–1.2)
BUN SERPL-MCNC: 13 MG/DL (ref 6–23)
CALCIUM SERPL-MCNC: 9.7 MG/DL (ref 8.6–10.3)
CHLORIDE SERPL-SCNC: 106 MMOL/L (ref 98–107)
CO2 SERPL-SCNC: 22 MMOL/L (ref 21–32)
CREAT SERPL-MCNC: 1.02 MG/DL (ref 0.5–1.3)
CRP SERPL-MCNC: <0.1 MG/DL
EGFRCR SERPLBLD CKD-EPI 2021: >90 ML/MIN/1.73M*2
EOSINOPHIL # BLD AUTO: 0.06 X10*3/UL (ref 0–0.7)
EOSINOPHIL NFR BLD AUTO: 1.2 %
ERYTHROCYTE [DISTWIDTH] IN BLOOD BY AUTOMATED COUNT: 13.8 % (ref 11.5–14.5)
ERYTHROCYTE [SEDIMENTATION RATE] IN BLOOD BY WESTERGREN METHOD: 18 MM/H (ref 0–15)
GLUCOSE SERPL-MCNC: 89 MG/DL (ref 74–99)
HCT VFR BLD AUTO: 42.7 % (ref 41–52)
HGB BLD-MCNC: 14.4 G/DL (ref 13.5–17.5)
IMM GRANULOCYTES # BLD AUTO: 0.01 X10*3/UL (ref 0–0.7)
IMM GRANULOCYTES NFR BLD AUTO: 0.2 % (ref 0–0.9)
LYMPHOCYTES # BLD AUTO: 1.21 X10*3/UL (ref 1.2–4.8)
LYMPHOCYTES NFR BLD AUTO: 24.4 %
MCH RBC QN AUTO: 30.1 PG (ref 26–34)
MCHC RBC AUTO-ENTMCNC: 33.7 G/DL (ref 32–36)
MCV RBC AUTO: 89 FL (ref 80–100)
MONOCYTES # BLD AUTO: 0.51 X10*3/UL (ref 0.1–1)
MONOCYTES NFR BLD AUTO: 10.3 %
NEUTROPHILS # BLD AUTO: 3.13 X10*3/UL (ref 1.2–7.7)
NEUTROPHILS NFR BLD AUTO: 63.3 %
NRBC BLD-RTO: 0 /100 WBCS (ref 0–0)
PLATELET # BLD AUTO: 197 X10*3/UL (ref 150–450)
POTASSIUM SERPL-SCNC: 4 MMOL/L (ref 3.5–5.3)
PROT SERPL-MCNC: 7.7 G/DL (ref 6.4–8.2)
RBC # BLD AUTO: 4.78 X10*6/UL (ref 4.5–5.9)
SODIUM SERPL-SCNC: 140 MMOL/L (ref 136–145)
WBC # BLD AUTO: 5 X10*3/UL (ref 4.4–11.3)

## 2025-08-13 PROCEDURE — 86140 C-REACTIVE PROTEIN: CPT | Performed by: INTERNAL MEDICINE

## 2025-08-13 PROCEDURE — 99283 EMERGENCY DEPT VISIT LOW MDM: CPT | Performed by: INTERNAL MEDICINE

## 2025-08-13 PROCEDURE — 85025 COMPLETE CBC W/AUTO DIFF WBC: CPT | Performed by: INTERNAL MEDICINE

## 2025-08-13 PROCEDURE — 2500000001 HC RX 250 WO HCPCS SELF ADMINISTERED DRUGS (ALT 637 FOR MEDICARE OP): Performed by: INTERNAL MEDICINE

## 2025-08-13 PROCEDURE — 80053 COMPREHEN METABOLIC PANEL: CPT | Performed by: INTERNAL MEDICINE

## 2025-08-13 PROCEDURE — 85652 RBC SED RATE AUTOMATED: CPT | Performed by: INTERNAL MEDICINE

## 2025-08-13 PROCEDURE — 36415 COLL VENOUS BLD VENIPUNCTURE: CPT | Performed by: INTERNAL MEDICINE

## 2025-08-13 RX ORDER — AMOXICILLIN AND CLAVULANATE POTASSIUM 875; 125 MG/1; MG/1
1 TABLET, FILM COATED ORAL ONCE
Status: COMPLETED | OUTPATIENT
Start: 2025-08-13 | End: 2025-08-13

## 2025-08-13 RX ORDER — AMOXICILLIN AND CLAVULANATE POTASSIUM 875; 125 MG/1; MG/1
1 TABLET, FILM COATED ORAL EVERY 12 HOURS
Qty: 20 TABLET | Refills: 0 | Status: SHIPPED | OUTPATIENT
Start: 2025-08-13 | End: 2025-08-23

## 2025-08-13 RX ORDER — OXYCODONE AND ACETAMINOPHEN 5; 325 MG/1; MG/1
1 TABLET ORAL ONCE
Refills: 0 | Status: COMPLETED | OUTPATIENT
Start: 2025-08-13 | End: 2025-08-13

## 2025-08-13 RX ORDER — OXYCODONE AND ACETAMINOPHEN 5; 325 MG/1; MG/1
1 TABLET ORAL EVERY 6 HOURS PRN
Qty: 5 TABLET | Refills: 0 | Status: SHIPPED | OUTPATIENT
Start: 2025-08-13 | End: 2025-08-16

## 2025-08-13 RX ADMIN — OXYCODONE HYDROCHLORIDE AND ACETAMINOPHEN 1 TABLET: 5; 325 TABLET ORAL at 18:28

## 2025-08-13 RX ADMIN — AMOXICILLIN AND CLAVULANATE POTASSIUM 1 TABLET: 875; 125 TABLET, FILM COATED ORAL at 18:28

## 2025-08-13 ASSESSMENT — PAIN SCALES - GENERAL
PAINLEVEL_OUTOF10: 10 - WORST POSSIBLE PAIN
PAINLEVEL_OUTOF10: 9
PAINLEVEL_OUTOF10: 10 - WORST POSSIBLE PAIN
PAINLEVEL_OUTOF10: 0 - NO PAIN

## 2025-08-13 ASSESSMENT — PAIN DESCRIPTION - ORIENTATION
ORIENTATION: LEFT
ORIENTATION: LEFT

## 2025-08-13 ASSESSMENT — PAIN - FUNCTIONAL ASSESSMENT
PAIN_FUNCTIONAL_ASSESSMENT: 0-10
PAIN_FUNCTIONAL_ASSESSMENT: 0-10

## 2025-08-13 ASSESSMENT — PAIN DESCRIPTION - DESCRIPTORS: DESCRIPTORS: ACHING

## 2025-08-13 ASSESSMENT — PAIN DESCRIPTION - LOCATION
LOCATION: MOUTH
LOCATION: MOUTH

## 2025-09-03 VITALS
BODY MASS INDEX: 29.16 KG/M2 | DIASTOLIC BLOOD PRESSURE: 80 MMHG | TEMPERATURE: 97.9 F | RESPIRATION RATE: 18 BRPM | HEART RATE: 95 BPM | SYSTOLIC BLOOD PRESSURE: 137 MMHG | OXYGEN SATURATION: 97 % | WEIGHT: 220 LBS | HEIGHT: 73 IN

## 2025-09-03 PROCEDURE — 99282 EMERGENCY DEPT VISIT SF MDM: CPT

## 2025-09-03 ASSESSMENT — PAIN DESCRIPTION - PAIN TYPE: TYPE: ACUTE PAIN

## 2025-09-03 ASSESSMENT — PAIN DESCRIPTION - LOCATION: LOCATION: JAW

## 2025-09-03 ASSESSMENT — PAIN SCALES - GENERAL: PAINLEVEL_OUTOF10: 8

## 2025-09-03 ASSESSMENT — PAIN - FUNCTIONAL ASSESSMENT: PAIN_FUNCTIONAL_ASSESSMENT: 0-10

## 2025-09-03 ASSESSMENT — PAIN DESCRIPTION - DESCRIPTORS: DESCRIPTORS: ACHING

## 2025-09-04 ENCOUNTER — HOSPITAL ENCOUNTER (EMERGENCY)
Facility: HOSPITAL | Age: 45
Discharge: LEFT BEFORE DISCHARGE | End: 2025-09-04
Payer: COMMERCIAL